# Patient Record
Sex: FEMALE | Employment: OTHER
[De-identification: names, ages, dates, MRNs, and addresses within clinical notes are randomized per-mention and may not be internally consistent; named-entity substitution may affect disease eponyms.]

---

## 2017-02-09 ENCOUNTER — OFFICE VISIT (OUTPATIENT)
Dept: OBGYN | Facility: CLINIC | Age: 62
End: 2017-02-09

## 2017-02-09 ENCOUNTER — HOSPITAL ENCOUNTER (OUTPATIENT)
Age: 62
Setting detail: SPECIMEN
Discharge: HOME | End: 2017-02-09
Payer: COMMERCIAL

## 2017-02-09 VITALS
HEIGHT: 63 IN | SYSTOLIC BLOOD PRESSURE: 130 MMHG | WEIGHT: 170 LBS | DIASTOLIC BLOOD PRESSURE: 70 MMHG | BODY MASS INDEX: 30.12 KG/M2

## 2017-02-09 DIAGNOSIS — Z12.31 ENCOUNTER FOR SCREENING MAMMOGRAM FOR MALIGNANT NEOPLASM OF BREAST: ICD-10-CM

## 2017-02-09 DIAGNOSIS — Z01.419 WOMEN'S ANNUAL ROUTINE GYNECOLOGICAL EXAMINATION: Primary | ICD-10-CM

## 2017-02-09 PROCEDURE — 99396 PREV VISIT EST AGE 40-64: CPT | Performed by: OBSTETRICS & GYNECOLOGY

## 2017-02-09 ASSESSMENT — ENCOUNTER SYMPTOMS
COUGH: 0
PHOTOPHOBIA: 0
NAUSEA: 0
BLOOD IN STOOL: 0
CHEST TIGHTNESS: 0
ABDOMINAL PAIN: 0
SHORTNESS OF BREATH: 0
DIARRHEA: 0
BACK PAIN: 0
SINUS PRESSURE: 1
RECTAL PAIN: 0
CONSTIPATION: 0
ANAL BLEEDING: 0
APNEA: 0
WHEEZING: 0
ABDOMINAL DISTENTION: 0
TROUBLE SWALLOWING: 0
SORE THROAT: 0

## 2017-02-16 LAB — CYTOLOGY REPORT: NORMAL

## 2017-02-27 ENCOUNTER — HOSPITAL ENCOUNTER (OUTPATIENT)
Dept: MAMMOGRAPHY | Age: 62
Discharge: HOME | End: 2017-02-27
Payer: COMMERCIAL

## 2017-02-27 DIAGNOSIS — Z12.31 ENCOUNTER FOR SCREENING MAMMOGRAM FOR MALIGNANT NEOPLASM OF BREAST: ICD-10-CM

## 2017-02-27 PROCEDURE — G0202 SCR MAMMO BI INCL CAD: HCPCS

## 2017-02-27 PROCEDURE — 77067 SCR MAMMO BI INCL CAD: CPT | Performed by: RADIOLOGY

## 2018-03-21 ENCOUNTER — HOSPITAL ENCOUNTER (OUTPATIENT)
Age: 63
Setting detail: SPECIMEN
Discharge: HOME | End: 2018-03-21
Payer: COMMERCIAL

## 2018-03-21 ENCOUNTER — OFFICE VISIT (OUTPATIENT)
Dept: OBGYN CLINIC | Age: 63
End: 2018-03-21
Payer: COMMERCIAL

## 2018-03-21 VITALS — HEIGHT: 63 IN | WEIGHT: 182 LBS | BODY MASS INDEX: 32.25 KG/M2

## 2018-03-21 DIAGNOSIS — Z01.419 WOMEN'S ANNUAL ROUTINE GYNECOLOGICAL EXAMINATION: Primary | ICD-10-CM

## 2018-03-21 DIAGNOSIS — Z12.31 ENCOUNTER FOR SCREENING MAMMOGRAM FOR MALIGNANT NEOPLASM OF BREAST: ICD-10-CM

## 2018-03-21 PROCEDURE — 99396 PREV VISIT EST AGE 40-64: CPT | Performed by: OBSTETRICS & GYNECOLOGY

## 2018-03-21 RX ORDER — VALSARTAN 80 MG/1
1 TABLET ORAL DAILY
COMMUNITY
Start: 2018-03-08

## 2018-03-21 RX ORDER — HYDROCORTISONE 5 MG/1
11.5 TABLET ORAL DAILY
COMMUNITY
Start: 2018-02-23

## 2018-03-21 RX ORDER — PENICILLIN V POTASSIUM 500 MG/1
500 TABLET ORAL 4 TIMES DAILY
COMMUNITY
End: 2018-03-21 | Stop reason: SDUPTHER

## 2018-03-21 RX ORDER — MINOCYCLINE HYDROCHLORIDE 50 MG/1
1 CAPSULE ORAL 2 TIMES DAILY
COMMUNITY
Start: 2018-02-05

## 2018-03-21 RX ORDER — VITAMIN B COMPLEX
1 CAPSULE ORAL DAILY
COMMUNITY

## 2018-03-21 RX ORDER — MINOCYCLINE HYDROCHLORIDE 50 MG/1
50 TABLET ORAL 2 TIMES DAILY
COMMUNITY
End: 2018-03-21 | Stop reason: SDUPTHER

## 2018-03-21 ASSESSMENT — ENCOUNTER SYMPTOMS
CHEST TIGHTNESS: 0
TROUBLE SWALLOWING: 0
BLOOD IN STOOL: 0
ANAL BLEEDING: 0
SHORTNESS OF BREATH: 0
ABDOMINAL PAIN: 0
DIARRHEA: 0
NAUSEA: 0
ABDOMINAL DISTENTION: 0
SORE THROAT: 0
BACK PAIN: 0
CONSTIPATION: 0
VOMITING: 0
WHEEZING: 0
COUGH: 0
APNEA: 0
RECTAL PAIN: 0

## 2018-03-21 NOTE — PROGRESS NOTES
immunocompromised state. Neurological: Negative for dizziness, seizures, syncope, speech difficulty, weakness, light-headedness, numbness and headaches. Hematological: Negative for adenopathy. Does not bruise/bleed easily. Psychiatric/Behavioral: Negative for agitation, decreased concentration, dysphoric mood, sleep disturbance and suicidal ideas. The patient is not nervous/anxious. ______________________________________________________________________  Physical Exam   Constitutional: She is oriented to person, place, and time. She appears well-developed and well-nourished. No distress. HENT:   Head: Normocephalic and atraumatic. Mouth/Throat: Oropharynx is clear and moist.   Eyes: Pupils are equal, round, and reactive to light. Neck: Normal range of motion. Neck supple. No thyromegaly present. Cardiovascular: Normal rate, regular rhythm and normal heart sounds. No murmur heard. Pulmonary/Chest: Effort normal and breath sounds normal. She has no wheezes. She has no rales. She exhibits no tenderness. Abdominal: Soft. Bowel sounds are normal. She exhibits no distension and no mass. There is no tenderness. There is no rebound and no guarding. Genitourinary: Vagina normal and uterus normal. Rectal exam shows no external hemorrhoid and anal tone normal. No breast swelling, tenderness, discharge or bleeding. There is no lesion on the right labia. There is no lesion on the left labia. Uterus is not deviated, not enlarged, not fixed and not tender. Cervix exhibits no motion tenderness, no discharge and no friability. Right adnexum displays no mass, no tenderness and no fullness. Left adnexum displays no mass, no tenderness and no fullness. No erythema in the vagina. No vaginal discharge found. Musculoskeletal: Normal range of motion. She exhibits no edema or tenderness. Lymphadenopathy:     She has no cervical adenopathy. Right: No inguinal adenopathy present.         Left: No inguinal

## 2018-03-21 NOTE — LETTER
[address] [place] [address] [address]  [facility] [place], [facility]  Phone: [phone]  Fax: [phone]    Dear Magnus Mccoy,    The results of the following test(s) you had done  are as follows and requires follow up as indicated.         Within Normal Limits   Further Action     Annual Follow Up      As Directed         Pap Smear:     [x]         []        Mammogram:             []          []   Endometrial Biopsy:               []                                            []   Cervical Biopsy:                     []                                             []   Dexascan:                                []                                            []                    Other:            Instructions for further action:         Even if findings are within normal limits now, it is important to continue annual visits with your doctor for regular screenings and exam.    Thank You,     Dr. Zuri Nash

## 2018-03-23 LAB
HPV SAMPLE: NORMAL
HPV SOURCE: NORMAL
HPV, GENOTYPE 16: NOT DETECTED
HPV, GENOTYPE 18: NOT DETECTED
HPV, HIGH RISK OTHER: NOT DETECTED
HPV, INTERPRETATION: NORMAL

## 2018-04-05 ENCOUNTER — HOSPITAL ENCOUNTER (OUTPATIENT)
Dept: MAMMOGRAPHY | Age: 63
Discharge: HOME | End: 2018-04-07
Payer: COMMERCIAL

## 2018-04-05 DIAGNOSIS — Z12.31 ENCOUNTER FOR SCREENING MAMMOGRAM FOR MALIGNANT NEOPLASM OF BREAST: ICD-10-CM

## 2018-04-05 LAB — CYTOLOGY REPORT: NORMAL

## 2018-04-05 PROCEDURE — 77067 SCR MAMMO BI INCL CAD: CPT

## 2019-03-27 ENCOUNTER — OFFICE VISIT (OUTPATIENT)
Dept: OBGYN CLINIC | Age: 64
End: 2019-03-27
Payer: MEDICARE

## 2019-03-27 ENCOUNTER — HOSPITAL ENCOUNTER (OUTPATIENT)
Age: 64
Setting detail: SPECIMEN
Discharge: HOME | End: 2019-03-27
Payer: MEDICARE

## 2019-03-27 VITALS
SYSTOLIC BLOOD PRESSURE: 138 MMHG | HEIGHT: 63 IN | BODY MASS INDEX: 32.71 KG/M2 | DIASTOLIC BLOOD PRESSURE: 70 MMHG | WEIGHT: 184.6 LBS

## 2019-03-27 DIAGNOSIS — Z12.31 ENCOUNTER FOR SCREENING MAMMOGRAM FOR MALIGNANT NEOPLASM OF BREAST: ICD-10-CM

## 2019-03-27 DIAGNOSIS — Z01.419 WELL FEMALE EXAM WITH ROUTINE GYNECOLOGICAL EXAM: Primary | ICD-10-CM

## 2019-03-27 PROCEDURE — G8484 FLU IMMUNIZE NO ADMIN: HCPCS | Performed by: OBSTETRICS & GYNECOLOGY

## 2019-03-27 PROCEDURE — G0101 CA SCREEN;PELVIC/BREAST EXAM: HCPCS | Performed by: OBSTETRICS & GYNECOLOGY

## 2019-03-27 RX ORDER — GLUCOSAMINE HCL 500 MG
1000 TABLET ORAL DAILY
COMMUNITY

## 2019-03-27 RX ORDER — CLONIDINE HYDROCHLORIDE 0.2 MG/1
0.2 TABLET ORAL
COMMUNITY

## 2019-03-27 RX ORDER — PENICILLIN V POTASSIUM 500 MG/1
500 TABLET ORAL 4 TIMES DAILY
COMMUNITY

## 2019-03-27 ASSESSMENT — ENCOUNTER SYMPTOMS
ABDOMINAL PAIN: 0
DIARRHEA: 0
NAUSEA: 0
WHEEZING: 0
COUGH: 0
BACK PAIN: 0
BLOOD IN STOOL: 0
APNEA: 0
CONSTIPATION: 0
SORE THROAT: 0
CHEST TIGHTNESS: 0
ANAL BLEEDING: 0
ABDOMINAL DISTENTION: 0
SHORTNESS OF BREATH: 0
TROUBLE SWALLOWING: 0
RECTAL PAIN: 0
PHOTOPHOBIA: 0

## 2019-04-09 LAB — CYTOLOGY REPORT: NORMAL

## 2019-04-22 ENCOUNTER — HOSPITAL ENCOUNTER (OUTPATIENT)
Dept: MAMMOGRAPHY | Age: 64
Discharge: HOME | End: 2019-04-24
Payer: MEDICARE

## 2019-04-22 DIAGNOSIS — Z12.31 ENCOUNTER FOR SCREENING MAMMOGRAM FOR MALIGNANT NEOPLASM OF BREAST: ICD-10-CM

## 2019-04-22 PROCEDURE — 77063 BREAST TOMOSYNTHESIS BI: CPT

## 2020-06-08 ENCOUNTER — OFFICE VISIT (OUTPATIENT)
Dept: OBGYN CLINIC | Age: 65
End: 2020-06-08
Payer: MEDICARE

## 2020-06-08 ENCOUNTER — HOSPITAL ENCOUNTER (OUTPATIENT)
Age: 65
Setting detail: SPECIMEN
Discharge: HOME | End: 2020-06-08
Payer: MEDICARE

## 2020-06-08 VITALS
BODY MASS INDEX: 30.12 KG/M2 | DIASTOLIC BLOOD PRESSURE: 74 MMHG | HEIGHT: 63 IN | SYSTOLIC BLOOD PRESSURE: 134 MMHG | WEIGHT: 170 LBS

## 2020-06-08 PROCEDURE — G0101 CA SCREEN;PELVIC/BREAST EXAM: HCPCS | Performed by: OBSTETRICS & GYNECOLOGY

## 2020-06-08 RX ORDER — CLINDAMYCIN HYDROCHLORIDE 300 MG/1
300 CAPSULE ORAL 2 TIMES DAILY
COMMUNITY

## 2020-06-08 RX ORDER — CHLORZOXAZONE 750 MG/1
750 TABLET ORAL 3 TIMES DAILY PRN
COMMUNITY

## 2020-06-08 RX ORDER — GUANFACINE 1 MG/1
1 TABLET ORAL 3 TIMES DAILY
COMMUNITY

## 2020-06-08 RX ORDER — TORSEMIDE 10 MG/1
10 TABLET ORAL 2 TIMES DAILY
COMMUNITY

## 2020-06-08 ASSESSMENT — ENCOUNTER SYMPTOMS
VOMITING: 0
CONSTIPATION: 0
WHEEZING: 0
DIARRHEA: 0
ABDOMINAL PAIN: 0
NAUSEA: 0
COUGH: 0

## 2020-06-08 NOTE — PROGRESS NOTES
[facility]  [facility]  [address] [facility]  [address] [facility]  [address] [id number]  Dept: [phone]  OF VISIT:  20        History and Physical    Samanta Rodríguez    :  1955  CHIEF COMPLAINT:    Chief Complaint   Patient presents with    Annual Exam     Prev Pap: 3/27/19 WNL; Prev Sondra: 19 WNL (WANTS TO SELF PAY FOR PAP SMEAR)                    HPI :   Samanta Rodríguez is a 75 y.o. female    The patient was seen and examined. Per the patient bowels are regular. She has novoiding complaints.   She denies any bloating as well as vaginal discharge.  _____________________________________________________________________  Past Medical History:   Diagnosis Date    Abnormal Pap smear 2004,     d/t ASCUS    Atrial fibrillation (HCC)     due to lyme disease    Chronic periodontal disease     Diverticulosis     Factor 5 Leiden mutation, heterozygous ([address]     On baby asa daily    Fibrocystic breast 04    Fibroid     USN    Foot fracture left    hairline    Genuine stress incontinence, female 03    HIGH CHOLESTEROL     History of cervical dysplasia 07    LEEP's x2    HPV (human papilloma virus) anogenital infection     Hypertension     Lyme disease     chronic    Osteopenia 06    Osteoporosis     of jaw    Posterior vitreous detachment of left eye     Scoliosis                                                                    Past Surgical History:   Procedure Laterality Date    BARTHOLIN GLAND CYST EXCISION      COLONOSCOPY   and     1st was a screening and 2nd was a diagnostic=diverticulitis    COLPOSCOPY  05    DILATION AND CURETTAGE  [id number]    due to miscarriage    LAPAROSCOPY  1993    pelvic congestion    LEEP  , 2006   [address]    x's 2---Jaw     Family History   Problem Relation Age of Onset    Other Father         Epilepsy, Pulomnary fibrosis    Other Paternal Grandfather minocycline 50 mg BID      aspirin 81 MG tablet Take 81 mg by mouth daily.  polyethyl glycol-propyl glycol 0.4-0.3 % (SYSTANE) 0.4-0.3 % ophthalmic solution 1 drop by Other route 4 times daily       Multiple Vitamin (MULTIVITAMIN PO) Take 1 tablet by mouth daily.  FLAXSEED Take 2 packets by mouth daily. 2 tablespoons       cloNIDine (CATAPRES) 0.2 MG tablet Take 0.2 mg by mouth Once weekly      Cholecalciferol (VITAMIN D3) 3000 units TABS Take 1,000 Units by mouth daily      valsartan (DIOVAN) 80 MG tablet Take 1 tablet by mouth daily      Propylene Glycol 0.6 % SOLN Instill to eye.  b complex vitamins capsule Take 1 capsule by mouth daily      NONFORMULARY Indications: Ampho - B tobra saline irrigation 1 x daily for sinus      doxycycline (ORACEA) 40 MG capsule Take 40 mg by mouth 2 times daily. No current facility-administered medications for this visit. Allergies:  Sulfa antibiotics    Gynecologic History:  No LMP recorded. Patient is postmenopausal.  Sexually Active: No  STD History: Yes HPV      OB History    Para Term  AB Living   7 5 0 0 2 0   SAB TAB Ectopic Molar Multiple Live Births   2 0 0 0 0 0     _____________________________________    Review of Systems   Constitutional: Negative for chills and fever. HENT: Negative for hearing loss. Respiratory: Negative for cough and wheezing. Cardiovascular: Negative for chest pain and palpitations. Gastrointestinal: Negative for abdominal pain, constipation, diarrhea, nausea and vomiting. Genitourinary: Negative for dysuria, frequency and urgency. Musculoskeletal: Negative for myalgias. Skin: Negative for rash. Neurological: Negative for dizziness, weakness and headaches. Hematological: Does not bruise/bleed easily. Psychiatric/Behavioral: Negative for suicidal ideas.        /74 (Position: Sitting, Cuff Size: Medium Adult)   Ht 5' 3.25\" (1.607 m)   Wt 170 lb (77.1 kg)   BMI 29.88

## 2020-06-11 LAB
CYTOLOGY REPORT: NORMAL
HPV SAMPLE: NORMAL
HPV, GENOTYPE 16: NOT DETECTED
HPV, GENOTYPE 18: NOT DETECTED
HPV, HIGH RISK OTHER: NOT DETECTED
HPV, INTERPRETATION: NORMAL
SPECIMEN DESCRIPTION: NORMAL

## 2020-06-17 ENCOUNTER — HOSPITAL ENCOUNTER (OUTPATIENT)
Dept: MAMMOGRAPHY | Age: 65
Discharge: HOME | End: 2020-06-19
Payer: MEDICARE

## 2020-06-17 PROCEDURE — 77063 BREAST TOMOSYNTHESIS BI: CPT

## 2021-03-09 ENCOUNTER — TELEPHONE (OUTPATIENT)
Dept: OBGYN CLINIC | Age: 66
End: 2021-03-09

## 2021-03-09 NOTE — TELEPHONE ENCOUNTER
Pt states PCP retired who prescribed her progesterone and was wondering if we would prescribe it for her now.

## 2021-03-09 NOTE — TELEPHONE ENCOUNTER
We can probably take over prescribing the progesterone, but there's no record of the dose or name of the progesterone (there's several kinds), or why she's taking it. Can we just get this information from her? All scripts need to be sent electronically so we'll need the specific information. Thanks!

## 2021-03-09 NOTE — TELEPHONE ENCOUNTER
Pt called back, she gets her Rx filled at [facility]. Her PCP was filling Rx but has retired and she has 5 or 6 refills left.

## 2021-03-11 ENCOUNTER — HOSPITAL ENCOUNTER (OUTPATIENT)
Age: 66
Setting detail: SPECIMEN
Discharge: HOME | End: 2021-03-11
Payer: MEDICARE

## 2021-03-11 ENCOUNTER — TELEPHONE (OUTPATIENT)
Dept: OBGYN CLINIC | Age: 66
End: 2021-03-11

## 2021-03-11 LAB
T3 FREE: 3.42 PG/ML (ref 2.02–4.43)
THYROXINE, FREE: 1.26 NG/DL (ref 0.93–1.7)
TSH SERPL DL<=0.05 MIU/L-ACNC: 1.28 MIU/L (ref 0.3–5)

## 2021-04-12 ENCOUNTER — HOSPITAL ENCOUNTER (OUTPATIENT)
Age: 66
Setting detail: SPECIMEN
Discharge: HOME | End: 2021-04-12
Payer: MEDICARE

## 2021-04-12 LAB
ABSOLUTE EOS #: 0.03 K/UL (ref 0–0.44)
ABSOLUTE IMMATURE GRANULOCYTE: <0.03 K/UL (ref 0–0.3)
ABSOLUTE LYMPH #: 1.15 K/UL (ref 1.1–3.7)
ABSOLUTE MONO #: 0.52 K/UL (ref 0.1–1.2)
ALBUMIN SERPL-MCNC: 4.3 G/DL (ref 3.5–5.2)
ALBUMIN/GLOBULIN RATIO: 1.1 (ref 1–2.5)
ALP BLD-CCNC: 81 U/L (ref 35–104)
ALT SERPL-CCNC: 19 U/L (ref 5–33)
ANION GAP SERPL CALCULATED.3IONS-SCNC: 14 MMOL/L (ref 9–17)
AST SERPL-CCNC: 18 U/L
BASOPHILS # BLD: 0 % (ref 0–2)
BASOPHILS ABSOLUTE: <0.03 K/UL (ref 0–0.2)
BILIRUB SERPL-MCNC: 0.19 MG/DL (ref 0.3–1.2)
BILIRUBIN URINE: NEGATIVE
BUN BLDV-MCNC: 18 MG/DL (ref 8–23)
BUN/CREAT BLD: ABNORMAL (ref 9–20)
C-REACTIVE PROTEIN: 3.8 MG/L (ref 0–5)
CALCIUM SERPL-MCNC: 9.4 MG/DL (ref 8.6–10.4)
CHLORIDE BLD-SCNC: 102 MMOL/L (ref 98–107)
CHOLESTEROL/HDL RATIO: 4.1
CHOLESTEROL: 222 MG/DL
CO2: 23 MMOL/L (ref 20–31)
COLOR: YELLOW
COMMENT UA: NORMAL
CREAT SERPL-MCNC: 0.58 MG/DL (ref 0.5–0.9)
DIFFERENTIAL TYPE: ABNORMAL
EOSINOPHILS RELATIVE PERCENT: 1 % (ref 1–4)
ESTIMATED AVERAGE GLUCOSE: 123 MG/DL
ESTRADIOL LEVEL: 16 PG/ML (ref 5–50)
GFR AFRICAN AMERICAN: >60 ML/MIN
GFR NON-AFRICAN AMERICAN: >60 ML/MIN
GFR SERPL CREATININE-BSD FRML MDRD: ABNORMAL ML/MIN/{1.73_M2}
GFR SERPL CREATININE-BSD FRML MDRD: ABNORMAL ML/MIN/{1.73_M2}
GLUCOSE BLD-MCNC: 90 MG/DL (ref 70–99)
GLUCOSE URINE: NEGATIVE
HBA1C MFR BLD: 5.9 % (ref 4–6)
HCT VFR BLD CALC: 42.4 % (ref 36.3–47.1)
HDLC SERPL-MCNC: 54 MG/DL
HEMOGLOBIN: 13.4 G/DL (ref 11.9–15.1)
IMMATURE GRANULOCYTES: 0 %
KETONES, URINE: NEGATIVE
LDL CHOLESTEROL: 146 MG/DL (ref 0–130)
LEUKOCYTE ESTERASE, URINE: NEGATIVE
LH: 38.3 U/L (ref 7.7–58.5)
LYMPHOCYTES # BLD: 21 % (ref 24–43)
MAGNESIUM: 2.2 MG/DL (ref 1.6–2.6)
MCH RBC QN AUTO: 29.2 PG (ref 25.2–33.5)
MCHC RBC AUTO-ENTMCNC: 31.6 G/DL (ref 28.4–34.8)
MCV RBC AUTO: 92.4 FL (ref 82.6–102.9)
MONOCYTES # BLD: 9 % (ref 3–12)
NITRITE, URINE: NEGATIVE
NRBC AUTOMATED: 0 PER 100 WBC
PDW BLD-RTO: 12.4 % (ref 11.8–14.4)
PH UA: 5 (ref 5–8)
PLATELET # BLD: 213 K/UL (ref 138–453)
PLATELET ESTIMATE: ABNORMAL
PMV BLD AUTO: 10.6 FL (ref 8.1–13.5)
POTASSIUM SERPL-SCNC: 4.5 MMOL/L (ref 3.7–5.3)
PROGESTERONE LEVEL: 3.85 NG/ML
PROTEIN UA: NEGATIVE
RBC # BLD: 4.59 M/UL (ref 3.95–5.11)
RBC # BLD: ABNORMAL 10*6/UL
RHEUMATOID FACTOR: <10 IU/ML
SEDIMENTATION RATE, ERYTHROCYTE: 27 MM (ref 0–30)
SEG NEUTROPHILS: 69 % (ref 36–65)
SEGMENTED NEUTROPHILS ABSOLUTE COUNT: 3.89 K/UL (ref 1.5–8.1)
SODIUM BLD-SCNC: 139 MMOL/L (ref 135–144)
SPECIFIC GRAVITY UA: 1.01 (ref 1–1.03)
TESTOSTERONE TOTAL: 19 NG/DL (ref 20–70)
TOTAL PROTEIN: 8.1 G/DL (ref 6.4–8.3)
TRIGL SERPL-MCNC: 108 MG/DL
TSH SERPL DL<=0.05 MIU/L-ACNC: 1.75 MIU/L (ref 0.3–5)
TURBIDITY: CLEAR
URIC ACID: 5.4 MG/DL (ref 2.4–5.7)
URINE HGB: NEGATIVE
UROBILINOGEN, URINE: NORMAL
VITAMIN D 25-HYDROXY: 92.4 NG/ML (ref 30–100)
VLDLC SERPL CALC-MCNC: ABNORMAL MG/DL (ref 1–30)
WBC # BLD: 5.6 K/UL (ref 3.5–11.3)
WBC # BLD: ABNORMAL 10*3/UL

## 2021-04-13 LAB — ANTI-NUCLEAR ANTIBODY (ANA): NEGATIVE

## 2021-04-14 LAB — ADRENOCORTICOTROPIC HORMONE: <5 PG/ML (ref 6–58)

## 2021-04-15 LAB
ALDOSTERONE COMMENT: NORMAL
ALDOSTERONE: 8 NG/DL

## 2021-04-16 ENCOUNTER — HOSPITAL ENCOUNTER (OUTPATIENT)
Age: 66
Setting detail: SPECIMEN
Discharge: HOME | End: 2021-04-16
Payer: MEDICARE

## 2021-04-16 LAB — PREGNENOLONE: 37 NG/DL (ref 15–132)

## 2021-04-21 LAB
17-KETOSTEROIDS 24 HOUR URINE: 10.7 MG/D (ref 3.2–10.6)
17-KETOSTEROIDS URINE: 5.8 MG/L
CREATININE URINE /24 HR: 1035 MG/D (ref 500–1400)
CREATININE URINE /VOLUME: 56 MG/DL
HOURS COLLECTED: 24
URINE VOLUME: 1848

## 2021-06-14 ENCOUNTER — OFFICE VISIT (OUTPATIENT)
Dept: OBGYN CLINIC | Age: 66
End: 2021-06-14
Payer: MEDICARE

## 2021-06-14 ENCOUNTER — HOSPITAL ENCOUNTER (OUTPATIENT)
Age: 66
Setting detail: SPECIMEN
Discharge: HOME | End: 2021-06-14
Payer: MEDICARE

## 2021-06-14 VITALS
HEIGHT: 63 IN | WEIGHT: 174 LBS | SYSTOLIC BLOOD PRESSURE: 132 MMHG | BODY MASS INDEX: 30.83 KG/M2 | DIASTOLIC BLOOD PRESSURE: 74 MMHG

## 2021-06-14 DIAGNOSIS — Z78.0 POSTMENOPAUSAL: ICD-10-CM

## 2021-06-14 DIAGNOSIS — Z12.31 ENCOUNTER FOR SCREENING MAMMOGRAM FOR BREAST CANCER: ICD-10-CM

## 2021-06-14 DIAGNOSIS — Z01.419 ENCOUNTER FOR GYNECOLOGICAL EXAMINATION: Primary | ICD-10-CM

## 2021-06-14 DIAGNOSIS — N95.0 POSTMENOPAUSAL BLEEDING: ICD-10-CM

## 2021-06-14 PROCEDURE — G0101 CA SCREEN;PELVIC/BREAST EXAM: HCPCS | Performed by: OBSTETRICS & GYNECOLOGY

## 2021-06-14 ASSESSMENT — ENCOUNTER SYMPTOMS
DIARRHEA: 0
COUGH: 0
ABDOMINAL PAIN: 0
CONSTIPATION: 0
VOMITING: 0
NAUSEA: 0
WHEEZING: 0

## 2021-06-14 NOTE — PROGRESS NOTES
[facility]  [facility] [address] [address]  Dept: [phone]  OF VISIT:  21        History and Physical    Mela Zhu    :  1955  CHIEF COMPLAINT:    Chief Complaint   Patient presents with    Annual Exam     Prev Pap: 20 WNL/HPV Neg; Prev Sondra: 20 WNL; Prev Dexa:  WNL                    HPI :   Mela Zhu is a 77 y.o. female    The patient was seen and examined. Per the patient bowels are regular. She has novoiding complaints. She denies any bloating as well as vaginal discharge. No vaginal dryness or hot flushes. Did notice two drops of blood on her pad (she wears for urinary incontinence), a few days ago. Pt requests yearly pap smears due her history of HPV in  and h/o 2 LEEP procedures.    _____________________________________________________________________  Past Medical History:   Diagnosis Date    Abnormal Pap smear 2004,     d/t ASCUS    Atrial fibrillation (HCC)     due to lyme disease    Chronic periodontal disease     Diverticulosis     Factor 5 Leiden mutation, heterozygous ([address]     On baby asa daily    Fibrocystic breast 04    Fibroid     USN    Foot fracture left    hairline    Genuine stress incontinence, female 03    HIGH CHOLESTEROL     History of cervical dysplasia 07    LEEP's x2    HPV (human papilloma virus) anogenital infection     Hypertension     Lyme disease     chronic    Osteopenia 06    Osteoporosis     of jaw    Posterior vitreous detachment of left eye     Scoliosis                                                                    Past Surgical History:   Procedure Laterality Date    BARTHOLIN GLAND CYST EXCISION      COLONOSCOPY   and     1st was a screening and 2nd was a diagnostic=diverticulitis    COLPOSCOPY  05    DILATION AND CURETTAGE  [id number]    due to miscarriage   [address]    pelvic congestion    LEEP  1994, 01/16/2006   [address]    x's 2---Jaw     Family History   Problem Relation Age of Onset    Other Father         Epilepsy, Pulomnary fibrosis    Other Paternal Grandfather         Heart Attacks    Other Maternal Grandmother         HD, Lung disease    Other Mother         Arthritis, phlebitis    Other Brother         Pancreatitis    Cancer Daughter         #1 - Melanoma at age 12   [facility] Other Daughter         #2 - Jaret Pedro, #3 binge eating disorder    Other Son         blood clots in both lungs at age 32, Leiden Factor 11    Other Other         oldest daughter 2 miscarriage sin 5 months     Social History     Tobacco Use   Smoking Status Never Smoker   Smokeless Tobacco Never Used     Social History     Substance and Sexual Activity   Alcohol Use No     Current Outpatient Medications   Medication Sig Dispense Refill    clindamycin (CLEOCIN) 300 MG capsule Take 300 mg by mouth 2 times daily      torsemide (DEMADEX) 10 MG tablet Take 10 mg by mouth 2 times daily      guanFACINE (TENEX) 1 MG tablet Take 1 mg by mouth 3 times daily      POTASSIUM PO Take 20 mcg by mouth daily      Sodium Fluoride (PREVIDENT DT) Place onto teeth Tooth Paste      Coenzyme Q10 (CO Q 10 PO) Take 1 capsule by mouth daily      penicillin v potassium (VEETID) 500 MG tablet Take 500 mg by mouth 4 times daily      NONFORMULARY ACTH gel. 1 ml daily      NONFORMULARY Cortef trini 1 mg as needed      hydrocortisone (CORTEF) 5 MG tablet Take 1 tablet by mouth 2 times daily      Hydroxocobalamin 1000 MCG/ML SOLN e3very 10 days      Magnesium Citrate (CITROMA PO) Take 296 mLs by mouth daily      minocycline (MINOCIN;DYNACIN) 50 MG capsule Take 1 capsule by mouth 2 times daily      metoprolol (TOPROL-XL) 25 MG XL tablet Take 50 mg by mouth 2 times daily       NONFORMULARY Indications: triest/ prog one capsule bid      NONFORMULARY Indications: natto-serazime for lyme disease 2 cap daily Cocktail of 3 different antibiotics m_ w- f every other week 300 mg clindamycinBID , 3/4 of 250 mg tablet BID, minocycline 50 mg BID      aspirin 81 MG tablet Take 81 mg by mouth daily.  polyethyl glycol-propyl glycol 0.4-0.3 % (SYSTANE) 0.4-0.3 % ophthalmic solution 1 drop by Other route 4 times daily       FLAXSEED Take 2 packets by mouth daily. 2 tablespoons       chlorzoxazone (LORZONE) 750 MG TABS tablet Take 750 mg by mouth 3 times daily as needed for Muscle spasms      cloNIDine (CATAPRES) 0.2 MG tablet Take 0.2 mg by mouth Once weekly      Cholecalciferol (VITAMIN D3) 3000 units TABS Take 1,000 Units by mouth daily      valsartan (DIOVAN) 80 MG tablet Take 1 tablet by mouth daily      Propylene Glycol 0.6 % SOLN Instill to eye.  b complex vitamins capsule Take 1 capsule by mouth daily      NONFORMULARY Indications: Ampho - B tobra saline irrigation 1 x daily for sinus      doxycycline (ORACEA) 40 MG capsule Take 40 mg by mouth 2 times daily.  Multiple Vitamin (MULTIVITAMIN PO) Take 1 tablet by mouth daily. No current facility-administered medications for this visit. Allergies:  Sulfa antibiotics    Gynecologic History:  No LMP recorded. Patient is postmenopausal.  Sexually Active: Yes  STD History: Yes HPV  Pap smear history: remote h/o HPV and LEEP x 2      OB History    Para Term  AB Living   7 5 0 0 2 0   SAB TAB Ectopic Molar Multiple Live Births   2 0 0 0 0 0     ______________________________________________________________________  REVIEW OF SYSTEMS:  Review of Systems   Constitutional: Negative for chills and fever. HENT: Negative for hearing loss. Respiratory: Negative for cough and wheezing. Cardiovascular: Negative for chest pain and palpitations. Gastrointestinal: Negative for abdominal pain, constipation, diarrhea, nausea and vomiting. Genitourinary: Negative for dysuria, frequency and urgency. Musculoskeletal: Negative for myalgias. Skin: Negative for rash. Neurological: Negative for dizziness, weakness and headaches. Hematological: Does not bruise/bleed easily. Psychiatric/Behavioral: Negative for suicidal ideas. /74 (Position: Sitting, Cuff Size: Large Adult)   Ht 5' 3.25\" (1.607 m)   Wt 174 lb (78.9 kg)   BMI 30.58 kg/m²                    Physical Exam:     Physical Exam  Constitutional:       Appearance: She is well-developed. HENT:      Head: Normocephalic. Neck:      Thyroid: No thyromegaly. Cardiovascular:      Rate and Rhythm: Normal rate and regular rhythm. Pulmonary:      Effort: Pulmonary effort is normal. No respiratory distress. Abdominal:      General: There is no distension. Palpations: Abdomen is soft. Tenderness: There is no abdominal tenderness. Genitourinary:     Labia:         Right: No rash, tenderness, lesion or injury. Left: No rash, tenderness, lesion or injury. Vagina: No signs of injury and foreign body. No vaginal discharge, erythema or tenderness. Cervix: No cervical motion tenderness, discharge or friability. Uterus: Not deviated, not enlarged, not fixed and not tender. Adnexa:         Right: No mass, tenderness or fullness. Left: No mass, tenderness or fullness. Musculoskeletal:         General: Normal range of motion. Cervical back: Normal range of motion. Skin:     General: Skin is warm and dry. Neurological:      Mental Status: She is alert and oriented to person, place, and time. Psychiatric:         Behavior: Behavior normal.         Thought Content: Thought content normal.         Judgment: Judgment normal.             ASSESSMENT:        77 y.o. Female; Annual   Diagnosis Orders   1. Encounter for gynecological examination  PAP SMEAR   2. Encounter for screening mammogram for breast cancer  ABNER DIGITAL SCREEN W OR WO CAD BILATERAL   3. Postmenopausal     4.  Postmenopausal bleeding  US PELVIS COMPLETE NON-OB TRANSABDOMINAL AND TRANSVAGINAL     Return in about 1 year (around 6/14/2022) for annual exam.              Hereditary Breast, Ovarian,Colon and Uterine Cancer screening Done. Tobacco & Secondary smoke risks reviewed; instructed oncessation and avoidance    PLAN:  - Pap collected per pt request.   -US for postmenopausal bleeding.   - Screening mammogram discussed and advised yearly if normal starting at age 36.  - Routine health maintenance per patients PCP.     Electronically signed by Ruth Carreno MD on 6/14/2021at 2:59 PM  [facility]

## 2021-06-17 LAB — CYTOLOGY REPORT: NORMAL

## 2021-06-22 ENCOUNTER — HOSPITAL ENCOUNTER (OUTPATIENT)
Age: 66
Setting detail: SPECIMEN
Discharge: HOME | End: 2021-06-22
Payer: MEDICARE

## 2021-06-22 LAB
C-REACTIVE PROTEIN: 3.4 MG/L (ref 0–5)
CORTISOL COLLECTION INFO: ABNORMAL
CORTISOL COLLECTION INFO: NORMAL
CORTISOL: 22 UG/DL (ref 2.7–18.4)
CORTISOL: 7.2 UG/DL (ref 2.7–18.4)
FOLLICLE STIMULATING HORMONE: 77.8 U/L (ref 25.8–134.8)
LH: 41.4 U/L (ref 7.7–58.5)
SEDIMENTATION RATE, ERYTHROCYTE: 27 MM (ref 0–30)
T3 FREE: 2.98 PG/ML (ref 2.02–4.43)
THYROXINE, FREE: 1.15 NG/DL (ref 0.93–1.7)
TSH SERPL DL<=0.05 MIU/L-ACNC: 1.97 MIU/L (ref 0.3–5)
VITAMIN D 25-HYDROXY: 84.6 NG/ML (ref 30–100)

## 2021-06-24 ENCOUNTER — TELEPHONE (OUTPATIENT)
Dept: OBGYN CLINIC | Age: 66
End: 2021-06-24

## 2021-06-24 LAB — PROLACTIN: 6.14 UG/L (ref 4.79–23.3)

## 2021-06-24 NOTE — TELEPHONE ENCOUNTER
I left Milagros a message to call. She has an apt here Monday for US results. Due to thickened endometrial lining  would like to do an EMB. I want to give her a heads up and advise some Ibuprofen prior to apt if she would like.

## 2021-06-25 ENCOUNTER — HOSPITAL ENCOUNTER (OUTPATIENT)
Dept: MAMMOGRAPHY | Age: 66
Discharge: HOME | End: 2021-06-27
Payer: MEDICARE

## 2021-06-25 VITALS — HEIGHT: 63 IN | BODY MASS INDEX: 29.95 KG/M2 | WEIGHT: 169 LBS

## 2021-06-25 DIAGNOSIS — Z12.31 ENCOUNTER FOR SCREENING MAMMOGRAM FOR BREAST CANCER: ICD-10-CM

## 2021-06-25 LAB
ALDOSTERONE COMMENT: NORMAL
ALDOSTERONE: 9.4 NG/DL
DHEAS (DHEA SULFATE): 125 UG/DL (ref 13–130)

## 2021-06-25 PROCEDURE — 77063 BREAST TOMOSYNTHESIS BI: CPT

## 2021-06-28 ENCOUNTER — OFFICE VISIT (OUTPATIENT)
Dept: OBGYN CLINIC | Age: 66
End: 2021-06-28
Payer: MEDICARE

## 2021-06-28 ENCOUNTER — HOSPITAL ENCOUNTER (OUTPATIENT)
Age: 66
Setting detail: SPECIMEN
Discharge: HOME | End: 2021-06-28
Payer: MEDICARE

## 2021-06-28 VITALS
WEIGHT: 172 LBS | HEIGHT: 63 IN | BODY MASS INDEX: 30.48 KG/M2 | SYSTOLIC BLOOD PRESSURE: 134 MMHG | DIASTOLIC BLOOD PRESSURE: 84 MMHG

## 2021-06-28 DIAGNOSIS — N95.0 POSTMENOPAUSAL BLEEDING: Primary | ICD-10-CM

## 2021-06-28 PROCEDURE — 58100 BIOPSY OF UTERUS LINING: CPT | Performed by: OBSTETRICS & GYNECOLOGY

## 2021-06-28 NOTE — PROGRESS NOTES
HPI:  Rasheeda Lao is a 77 y.o. [id number] with an episode of post menopausal spotting earlier this month. US showed a 6.41 cm uterus and 0.47 cm endometrium. Discussed need for biopsy to rule out cancerous or pre-cancerous lesion. Endometrial Biopsy:  The patient was counseled on the procedure. Risks, benefits and alternatives were reviewed. The patient is aware that this is diagnostic and not curative and a second procedure may be needed. A consent was reviewed and obtained. The patient was positioned comfortably on the exam table. A sterile speculum was placed into the vagina and the cervix was identified. It was cleansed with betadine. A single tooth tenaculum was not needed to stabilize the cervix. The aspirator was then gently passed into the endometrial cavity. The uterus sounded to 6 cm. Two passes were made with return of small amount of tissue. Tissue was obtained and sent to pathology. The patient tolerated the procedure well. Post procedure restrictions were reviewed and given to the patient. .  All counts and instruments were correct at the end of the procedure.      Michell Webb MD  6/28/21  3:50 PM

## 2021-06-30 LAB — SURGICAL PATHOLOGY REPORT: NORMAL

## 2022-07-26 ENCOUNTER — HOSPITAL ENCOUNTER (OUTPATIENT)
Age: 67
Setting detail: SPECIMEN
Discharge: HOME | End: 2022-07-26

## 2022-07-26 LAB
ABSOLUTE EOS #: 0.07 K/UL (ref 0–0.44)
ABSOLUTE IMMATURE GRANULOCYTE: 0.03 K/UL (ref 0–0.3)
ABSOLUTE LYMPH #: 0.98 K/UL (ref 1.1–3.7)
ABSOLUTE MONO #: 0.54 K/UL (ref 0.1–1.2)
ALBUMIN SERPL-MCNC: 4.4 G/DL (ref 3.5–5.2)
ALBUMIN/GLOBULIN RATIO: 1.2 (ref 1–2.5)
ALP BLD-CCNC: 94 U/L (ref 35–104)
ALT SERPL-CCNC: 14 U/L (ref 5–33)
ANION GAP SERPL CALCULATED.3IONS-SCNC: 18 MMOL/L (ref 9–17)
AST SERPL-CCNC: 17 U/L
BASOPHILS # BLD: 0 % (ref 0–2)
BASOPHILS ABSOLUTE: <0.03 K/UL (ref 0–0.2)
BILIRUB SERPL-MCNC: 0.31 MG/DL (ref 0.3–1.2)
BILIRUBIN URINE: NEGATIVE
BUN BLDV-MCNC: 20 MG/DL (ref 8–23)
C-REACTIVE PROTEIN: 4 MG/L (ref 0–5)
CALCIUM SERPL-MCNC: 9.3 MG/DL (ref 8.6–10.4)
CHLORIDE BLD-SCNC: 105 MMOL/L (ref 98–107)
CHOLESTEROL/HDL RATIO: 4.2
CHOLESTEROL: 214 MG/DL
CO2: 21 MMOL/L (ref 20–31)
COLOR: YELLOW
COMMENT UA: NORMAL
CREAT SERPL-MCNC: 0.64 MG/DL (ref 0.5–0.9)
EOSINOPHILS RELATIVE PERCENT: 1 % (ref 1–4)
ESTRADIOL LEVEL: 13.7 PG/ML (ref 5–50)
FOLATE: >20 NG/ML
FOLLICLE STIMULATING HORMONE: 69 MIU/ML (ref 25.8–134.8)
GFR AFRICAN AMERICAN: >60 ML/MIN
GFR NON-AFRICAN AMERICAN: >60 ML/MIN
GFR SERPL CREATININE-BSD FRML MDRD: ABNORMAL ML/MIN/{1.73_M2}
GLUCOSE BLD-MCNC: 89 MG/DL (ref 70–99)
GLUCOSE URINE: NEGATIVE
HCT VFR BLD CALC: 42.5 % (ref 36.3–47.1)
HDLC SERPL-MCNC: 51 MG/DL
HEMOGLOBIN: 13.3 G/DL (ref 11.9–15.1)
IMMATURE GRANULOCYTES: 1 %
KETONES, URINE: NEGATIVE
LDL CHOLESTEROL: 139 MG/DL (ref 0–130)
LEUKOCYTE ESTERASE, URINE: NEGATIVE
LH: 37.2 MIU/ML (ref 7.7–58.5)
LYMPHOCYTES # BLD: 17 % (ref 24–43)
MCH RBC QN AUTO: 29.3 PG (ref 25.2–33.5)
MCHC RBC AUTO-ENTMCNC: 31.3 G/DL (ref 28.4–34.8)
MCV RBC AUTO: 93.6 FL (ref 82.6–102.9)
MONOCYTES # BLD: 9 % (ref 3–12)
NITRITE, URINE: NEGATIVE
NRBC AUTOMATED: 0 PER 100 WBC
PDW BLD-RTO: 13.1 % (ref 11.8–14.4)
PH UA: 5.5 (ref 5–8)
PLATELET # BLD: 216 K/UL (ref 138–453)
PMV BLD AUTO: 11.3 FL (ref 8.1–13.5)
POTASSIUM SERPL-SCNC: 4.2 MMOL/L (ref 3.7–5.3)
PROGESTERONE LEVEL: 1.85 NG/ML
PROTEIN UA: NEGATIVE
RBC # BLD: 4.54 M/UL (ref 3.95–5.11)
SEDIMENTATION RATE, ERYTHROCYTE: 55 MM/HR (ref 0–30)
SEG NEUTROPHILS: 72 % (ref 36–65)
SEGMENTED NEUTROPHILS ABSOLUTE COUNT: 4.3 K/UL (ref 1.5–8.1)
SODIUM BLD-SCNC: 144 MMOL/L (ref 135–144)
SPECIFIC GRAVITY UA: 1.02 (ref 1–1.03)
THYROXINE, FREE: 1.09 NG/DL (ref 0.93–1.7)
TOTAL PROTEIN: 8.1 G/DL (ref 6.4–8.3)
TRIGL SERPL-MCNC: 119 MG/DL
TSH SERPL DL<=0.05 MIU/L-ACNC: 1.69 UIU/ML (ref 0.3–5)
TURBIDITY: CLEAR
URIC ACID: 5.7 MG/DL (ref 2.4–5.7)
URINE HGB: NEGATIVE
UROBILINOGEN, URINE: NORMAL
VITAMIN B-12: >2000 PG/ML (ref 232–1245)
WBC # BLD: 5.9 K/UL (ref 3.5–11.3)

## 2022-07-27 LAB
ADRENOCORTICOTROPIC HORMONE: <5 PG/ML (ref 6–58)
DHEAS (DHEA SULFATE): 83.3 UG/DL (ref 13–130)
T3 FREE: 2.95 PG/ML (ref 2.02–4.43)
TESTOSTERONE TOTAL: 16 NG/DL (ref 20–70)

## 2022-07-28 ENCOUNTER — HOSPITAL ENCOUNTER (OUTPATIENT)
Age: 67
Setting detail: SPECIMEN
Discharge: HOME | End: 2022-07-28

## 2022-07-28 ENCOUNTER — OFFICE VISIT (OUTPATIENT)
Dept: OBGYN CLINIC | Age: 67
End: 2022-07-28
Payer: MEDICARE

## 2022-07-28 VITALS
WEIGHT: 170 LBS | DIASTOLIC BLOOD PRESSURE: 88 MMHG | HEIGHT: 61 IN | SYSTOLIC BLOOD PRESSURE: 160 MMHG | BODY MASS INDEX: 32.1 KG/M2

## 2022-07-28 DIAGNOSIS — Z01.419 ENCOUNTER FOR WELL WOMAN EXAM: Primary | ICD-10-CM

## 2022-07-28 DIAGNOSIS — Z78.0 POSTMENOPAUSAL: ICD-10-CM

## 2022-07-28 DIAGNOSIS — Z12.11 SPECIAL SCREENING FOR MALIGNANT NEOPLASM OF COLON: ICD-10-CM

## 2022-07-28 DIAGNOSIS — Z12.31 ENCOUNTER FOR SCREENING MAMMOGRAM FOR BREAST CANCER: ICD-10-CM

## 2022-07-28 PROCEDURE — G0101 CA SCREEN;PELVIC/BREAST EXAM: HCPCS | Performed by: STUDENT IN AN ORGANIZED HEALTH CARE EDUCATION/TRAINING PROGRAM

## 2022-07-28 NOTE — PROGRESS NOTES
[address] [facility]  [address] [facility], [facility]      Bhaskar De La Rosa  2024                       Primary Care Physician: Gautam Salinas MD    CC:   Chief Complaint   Patient presents with    Annual Exam     p 21 neg m 21 birads1  FHxBrCa n/a          HPI: Bhaskar De La Rosa is a 69 y.o. female  No LMP recorded. Patient is postmenopausal.    The patient was seen and examined. She is here for an annual visit. She is complaining of nothing. Patient is menopausal and denies any bleeding. Her bowel habits are regular. She denies any bloating. She denies dysuria. She denies urinary leaking. She denies vaginal discharge.       Depression Screen: Symptoms of decreased mood absent  Symptoms of anhedonia absent  **If either question is answered in a  positive fashion then complete the PHQ9 Scoring Evaluation and make the appropriate referral**    REVIEW OF SYSTEMS:   Constitutional: negative fever, negative chills  HEENT: negative visual disturbances, negative headaches  Respiratory: negative dyspnea, negative cough  Cardiovascular: negative chest pain,  negative palpitations  Gastrointestinal: negative abdominal pain, negative RUQ pain, negative N/V, negative diarrhea, negative constipation  Genitourinary: negative dysuria, negative vaginal discharge  Dermatological: negative rash  Hematologic: negative bruising  Immunologic/Lymphatic: negative recent illness, negative recent sick contact  Musculoskeletal: negative back pain, negative myalgias, negative arthralgias  Neurological:  negative dizziness, negative weakness  Behavior/Psych: negative depression, negative anxiety      GYNECOLOGICAL HISTORY:  Age of Menarche: 13  Age of Menopause: 44     STD History: no past history    Permanent Sterilization: no  Reversible Birth Control: no  Hormone Replacement Exposure: no    OBSTETRICAL HISTORY:  OB History    Para Term  AB Living   7 5 0 0 2 0   SAB IAB Ectopic Molar Multiple Live Births   2 0 0 0 0 0      # Outcome Date GA Lbr Gunnar/2nd Weight Sex Delivery Anes PTL Lv   7 Para 1995   6 lb 3 oz (2.807 kg) F Vag-Spont         Birth Comments: Down's   6 Para 1990   7 lb (3.175 kg) F Vag-Spont      5 Para 1988   7 lb 7 oz (3.374 kg) F Vag-Spont      4 Para 1986   7 lb 12 oz (3.515 kg) M Vag-Spont      3 Para 1984   5 lb 12 oz (2.608 kg) F Vag-Spont      2 SAB            1 SAB                PAST MEDICAL HISTORY:   has a past medical history of Abnormal Pap smear, Atrial fibrillation ([facility] [address] Chronic periodontal disease, Diverticulosis, Factor 5 Leiden mutation, heterozygous ([facility] [address] Fibrocystic breast, Fibroid, Foot fracture, Genuine stress incontinence, female, HIGH CHOLESTEROL, History of cervical dysplasia, HPV (human papilloma virus) anogenital infection, Hypertension, Lyme disease, Osteopenia, Osteoporosis, Posterior vitreous detachment of left eye, and Scoliosis. PAST SURGICAL HISTORY:   has a past surgical history that includes Colposcopy (11/21/05); LEEP (1994, 01/16/2006); Mouth surgery (1981); Bartholin gland cyst excision; Colonoscopy (2003 and 2008); laparoscopy (1993); and Dilation & curettage (7701&[id number]). ALLERGIES:  is allergic to sulfa antibiotics. MEDICATIONS:  Prior to Admission medications    Medication Sig Start Date End Date Taking? Authorizing Provider   MISC NATURAL PRODUCTS PO Take by mouth Olive leaf extract - antiviral   Taking other natural supplements to support immune system  [place] - vit C   Yes Historical Provider, MD   clindamycin (CLEOCIN) 300 MG capsule Take 300 mg by mouth 2 times daily   Yes Historical Provider, MD   torsemide (DEMADEX) 10 MG tablet Take 10 mg by mouth in the morning and 10 mg in the evening. 0.25 tablet BID.    Yes Historical Provider, MD   chlorzoxazone (PARAFON FORTE) 750 MG TABS tablet Take 750 mg by mouth 3 times daily as needed for Muscle spasms   Yes Historical Provider, MD   guanFACINE (TENEX) 1 MG tablet Take 1 mg by mouth 3 times daily   Yes Historical Provider, MD   POTASSIUM PO Take 20 mcg by mouth daily   Yes Historical Provider, MD   Coenzyme Q10 (CO Q 10 PO) Take 1 capsule by mouth daily Citrus Q10   Yes Historical Provider, MD   penicillin v potassium (VEETID) 500 MG tablet Take 500 mg by mouth 4 times daily   Yes Historical Provider, MD   NONFORMULARY ACTH gel. 1 ml daily   Yes Historical Provider, MD   NONFORMULARY Cortef trini 2 mg as needed   Yes Historical Provider, MD   Cholecalciferol (VITAMIN D3) 3000 units TABS Take 1,000 Units by mouth daily   Yes Historical Provider, MD   hydrocortisone (CORTEF) 5 MG tablet Take 11.5 tablets by mouth in the morning. 11.5mg in am - 1mg @ noon. 2/23/18  Yes Historical Provider, MD   Hydroxocobalamin 1000 MCG/ML SOLN e3very 10 days 2/21/18  Yes Historical Provider, MD   Magnesium Citrate (CITROMA PO) Take 296 mLs by mouth daily 2tsp daily (powdder) - just magnesium   Yes Historical Provider, MD   minocycline (MINOCIN;DYNACIN) 50 MG capsule Take 1 capsule by mouth 2 times daily 2/5/18  Yes Historical Provider, MD   metoprolol (TOPROL-XL) 25 MG XL tablet Take 50 mg by mouth 2 times daily    Yes Historical Provider, MD   NONFORMULARY Indications: triest/ prog one capsule bid   Yes Historical Provider, MD   NONFORMULARY Indications: natto-serazime for lyme disease 2 cap daily Cocktail of 3 different antibiotics m_ w- f every other week 300 mg clindamycinBID , 3/4 of 250 mg capsulet BID, minocycline 50 mg BID   Yes Historical Provider, MD   NONFORMULARY Indications: Ampho - B tobra saline irrigation 1 x daily for sinus   Yes Historical Provider, MD   aspirin 81 MG tablet Take 81 mg by mouth daily. Yes Historical Provider, MD   polyethyl glycol-propyl glycol 0.4-0.3 % (SYSTANE) 0.4-0.3 % ophthalmic solution 1 drop by Other route 4 times daily    Yes Historical Provider, MD   FLAXSEED Take 2 packets by mouth daily.  2 tablespoons    Yes Historical Provider, MD   Sodium Fluoride (PREVIDENT DT) Place onto teeth Tooth Paste  Patient not taking: Reported on 7/28/2022    Historical Provider, MD   cloNIDine (CATAPRES) 0.2 MG tablet Take 0.2 mg by mouth Once weekly  Patient not taking: Reported on 7/28/2022    Historical Provider, MD   valsartan (DIOVAN) 80 MG tablet Take 1 tablet by mouth daily  Patient not taking: Reported on 7/28/2022 3/8/18   Historical Provider, MD   Propylene Glycol 0.6 % SOLN Instill to eye. Patient not taking: Reported on 7/28/2022    Historical Provider, MD   b complex vitamins capsule Take 1 capsule by mouth daily  Patient not taking: Reported on 7/28/2022    Historical Provider, MD   doxycycline (ORACEA) 40 MG capsule Take 40 mg by mouth 2 times daily. Patient not taking: Reported on 7/28/2022    Historical Provider, MD   Multiple Vitamin (MULTIVITAMIN PO) Take 1 tablet by mouth daily. Patient not taking: Reported on 7/28/2022    Historical Provider, MD       FAMILY HISTORY:  Family History of Breast, Ovarian, Colon or Uterine Cancer: No   family history includes Cancer in her daughter; Heart Failure (age of onset: 80) in her mother; Other in her brother, daughter, father, maternal grandmother, mother, paternal grandfather, son, and another family member. SOCIAL HISTORY:   reports that she has never smoked. She has never used smokeless tobacco. She reports that she does not drink alcohol and does not use drugs. HEALTH MAINTENANCE:  Immunization status: stated as up to date, no records available    [address] [place] [address]: BIRADS 1 6/21. Next due now. Colonoscopy: Desires Cologuard  Pap Smears: Last 6/21 Neg. Desires yearly due to Hx LEEP x 2. Family Planning: Menopausal  DEXA: 2016 Neg. Due now.     VITALS:  Vitals:    07/28/22 1424 07/28/22 1513   BP: (!) 145/85 (!) 160/88   Site: Left Upper Arm Right Upper Arm   Position: Sitting Sitting   Cuff Size: Small Adult Large Adult   Weight: 170 lb (77.1 kg) Height: 5' 1.25\" (1.556 m)                                                                                                                                                                                                    PHYSICAL EXAM:   General Appearance: Appears healthy. Alert; in no acute distress. Pleasant. Skin: Skin color, texture, turgor normal. No rashes or lesions. HEENT: normocephalic and atraumatic, Thyroid normal to inspection and palpation  Respiratory: Normal expansion. Clear to auscultation. No rales, rhonchi, or wheezing. Cardiovascular: normal rate, normal S1 and S2, no gallops, intact distal pulses and no carotid bruits  Breast:  (Chest): normal appearance, no masses or tenderness  Abdomen: soft, non-tender, non-distended, no right upper quadrant tenderness and no CVA tenderness  Pelvic Exam:   External genitalia: General appearance; normal, Hair distribution; normal, Lesions absent  Urinary system: urethral meatus normal  Vaginal: normal mucosa, no discharge  Cervix: normal appearing cervix without discharge or lesions  Adnexa: non-palpable  Uterus: normal single, nontender  Rectal Exam: exam declined by patient  Musculoskeletal: no gross abnormalities  Extremities: non-tender BLE and non-edematous  Psych:  oriented to time, place and person     DATA:  No results found for this visit on 22. ASSESSMENT & PLAN:    Gary Orellana is a 69 y.o. female  No LMP recorded. Patient is postmenopausal.      Annual:  Mammogram: BIRADS 1 . Next due now. Colonoscopy: Desires Cologuard  Pap Smears: Last  Neg. Desires yearly due to Hx LEEP x 2. Family Planning: Menopausal  DEXA:  Neg. Due now.     Elevated BP   - Follow up with PCP      Patient Active Problem List    Diagnosis Date Noted    Osteopenia 10/01/2012    History of cervical dysplasia 10/01/2012    GSI (genuine stress incontinence), female 10/01/2012    Fibroid 10/01/2012       Return in about 1 year (around 7/28/2023) for Annual.  No Patient Care Coordination Note on file. Counseling Completed:    Discussed need for repeat pap as per American Society for Colposcopy and Cervical Pathology guidelines. Discussed need for mammograms every 1 year, If >44 yo and last mammogram was negative. Discussed Calcium and Vitamin D dosing. Discussed need for colonoscopy screening as well as onset for bone density testing. Discussed birth control and barrier recommendations. Discussed STD counseling and prevention. Discussed Gardisil counseling for all patients 10-35 yo. Hereditary Breast, Ovarian, Colon and Uterine Cancer screening discussed. Tobacco & Secondary smoke risks discussed; with recommendation for cessation and avoidance. Routine health maintenance per patients PCP discussed. Diagnosis Orders   1. Encounter for well woman exam  PAP Smear      2. Encounter for screening mammogram for breast cancer  ABNER DIGITAL SCREEN W OR WO CAD BILATERAL      3. Postmenopausal  DEXA BONE DENSITY 2 SITES      4.  Special screening for malignant neoplasm of colon  Fecal DNA Colorectal cancer screening (Cologuard)           Myles Frias DO  [address] [facility]  7/28/2022, 4:08 PM

## 2022-07-28 NOTE — PROGRESS NOTES
Chaperone for Intimate Exam  Chaperone was offered as part of the rooming process. Patient declined and agrees to continue with exam without a chaperone.   Chaperone: n/a

## 2022-07-30 LAB — PREGNENOLONE: 54 NG/DL (ref 15–132)

## 2022-07-31 LAB
HPV SAMPLE: NORMAL
HPV, GENOTYPE 16: NOT DETECTED
HPV, GENOTYPE 18: NOT DETECTED
HPV, HIGH RISK OTHER: NOT DETECTED
HPV, INTERPRETATION: NORMAL
SPECIMEN DESCRIPTION: NORMAL

## 2022-08-04 LAB — CYTOLOGY REPORT: NORMAL

## 2022-08-08 LAB — 18-HYDROXYCORTICOSTERONE: <5 NG/DL

## 2022-08-11 ENCOUNTER — HOSPITAL ENCOUNTER (OUTPATIENT)
Dept: MAMMOGRAPHY | Age: 67
Discharge: HOME | End: 2022-08-13
Payer: MEDICARE

## 2022-08-11 DIAGNOSIS — Z12.31 ENCOUNTER FOR SCREENING MAMMOGRAM FOR BREAST CANCER: ICD-10-CM

## 2022-08-11 PROCEDURE — 77063 BREAST TOMOSYNTHESIS BI: CPT

## 2023-05-15 ENCOUNTER — HOSPITAL ENCOUNTER (OUTPATIENT)
Age: 68
Setting detail: SPECIMEN
Discharge: HOME | End: 2023-05-15

## 2023-05-15 LAB
ALBUMIN SERPL-MCNC: 4.1 G/DL (ref 3.5–5.2)
ALBUMIN/GLOB SERPL: 1.1 {RATIO} (ref 1–2.5)
ALP SERPL-CCNC: 85 U/L (ref 35–104)
ALT SERPL-CCNC: 16 U/L (ref 5–33)
ANION GAP SERPL CALCULATED.3IONS-SCNC: 14 MMOL/L (ref 9–17)
AST SERPL-CCNC: 17 U/L
BASOPHILS # BLD: 0 % (ref 0–2)
BASOPHILS # BLD: <0.03 K/UL (ref 0–0.2)
BILIRUB SERPL-MCNC: 0.2 MG/DL (ref 0.3–1.2)
BILIRUB UR QL STRIP: NEGATIVE
BNP SERPL-MCNC: 57 PG/ML
BUN SERPL-MCNC: 16 MG/DL (ref 8–23)
CALCIUM SERPL-MCNC: 9.2 MG/DL (ref 8.6–10.4)
CHLORIDE SERPL-SCNC: 105 MMOL/L (ref 98–107)
CHOLEST SERPL-MCNC: 226 MG/DL
CHOLESTEROL/HDL RATIO: 4.3
CLARITY UR: CLEAR
CO2 SERPL-SCNC: 23 MMOL/L (ref 20–31)
COLOR UR: YELLOW
COMMENT UA: NORMAL
CREAT SERPL-MCNC: 0.68 MG/DL (ref 0.5–0.9)
CRP SERPL HS-MCNC: 3.8 MG/L (ref 0–5)
EOSINOPHIL # BLD: 0.1 K/UL (ref 0–0.44)
EOSINOPHILS RELATIVE PERCENT: 1 % (ref 1–4)
ERYTHROCYTE [DISTWIDTH] IN BLOOD BY AUTOMATED COUNT: 12.4 % (ref 11.8–14.4)
ERYTHROCYTE [SEDIMENTATION RATE] IN BLOOD BY WESTERGREN METHOD: 23 MM/HR (ref 0–30)
ESTRADIOL LEVEL: 15.1 PG/ML (ref 5–50)
FSH SERPL-ACNC: 73.8 MIU/ML (ref 25.8–134.8)
GFR SERPL CREATININE-BSD FRML MDRD: >60 ML/MIN/1.73M2
GLUCOSE SERPL-MCNC: 100 MG/DL (ref 70–99)
GLUCOSE UR STRIP.AUTO-MCNC: NEGATIVE MG/DL
HCT VFR BLD AUTO: 41.9 % (ref 36.3–47.1)
HDLC SERPL-MCNC: 53 MG/DL
HGB BLD-MCNC: 13.3 G/DL (ref 11.9–15.1)
HGB UR QL STRIP.AUTO: NEGATIVE
IMM GRANULOCYTES # BLD AUTO: 0.03 K/UL (ref 0–0.3)
IMM GRANULOCYTES NFR BLD: 0 %
KETONES UR STRIP.AUTO-MCNC: NEGATIVE MG/DL
LDLC SERPL CALC-MCNC: 151 MG/DL (ref 0–130)
LEUKOCYTE ESTERASE UR QL STRIP: NEGATIVE
LH SERPL-ACNC: 40.2 MIU/ML (ref 7.7–58.5)
LYMPHOCYTES # BLD: 18 % (ref 24–43)
LYMPHOCYTES NFR BLD: 1.25 K/UL (ref 1.1–3.7)
MCH RBC QN AUTO: 29.9 PG (ref 25.2–33.5)
MCHC RBC AUTO-ENTMCNC: 31.7 G/DL (ref 28.4–34.8)
MCV RBC AUTO: 94.2 FL (ref 82.6–102.9)
MONOCYTES NFR BLD: 0.5 K/UL (ref 0.1–1.2)
MONOCYTES NFR BLD: 7 % (ref 3–12)
NEUTROPHILS NFR BLD: 74 % (ref 36–65)
NEUTS SEG NFR BLD: 5.02 K/UL (ref 1.5–8.1)
NITRITE UR QL STRIP: NEGATIVE
NRBC AUTOMATED: 0 PER 100 WBC
PLATELET # BLD AUTO: 229 K/UL (ref 138–453)
PMV BLD AUTO: 10.8 FL (ref 8.1–13.5)
POTASSIUM SERPL-SCNC: 4.3 MMOL/L (ref 3.7–5.3)
PROT SERPL-MCNC: 8 G/DL (ref 6.4–8.3)
PROT UR STRIP-MCNC: NEGATIVE MG/DL
PROT UR STRIP.AUTO-MCNC: 6.5 MG/DL (ref 5–8)
RBC # BLD AUTO: 4.45 M/UL (ref 3.95–5.11)
SODIUM SERPL-SCNC: 142 MMOL/L (ref 135–144)
SP GR UR STRIP.AUTO: 1.01 (ref 1–1.03)
TRIGL SERPL-MCNC: 112 MG/DL
UROBILINOGEN UR STRIP-ACNC: NORMAL
WBC OTHER # BLD: 6.9 K/UL (ref 3.5–11.3)

## 2023-05-16 LAB
25(OH)D3 SERPL-MCNC: 94 NG/ML
ACTH PLAS-MCNC: <5 PG/ML (ref 6–58)
DHEA-S SERPL-MCNC: 92.3 UG/DL (ref 13–130)
PROGEST SERPL-MCNC: 2.9 NG/ML
T3FREE SERPL-MCNC: 2.76 PG/ML (ref 2.02–4.43)
T4 FREE SERPL-MCNC: 1.2 NG/DL (ref 0.9–1.7)
TESTOST SERPL-MCNC: 18 NG/DL (ref 20–70)
TSH SERPL-ACNC: 1.97 UIU/ML (ref 0.3–5)

## 2023-05-18 LAB — PREGNENOLONE: 39 NG/DL (ref 15–132)

## 2023-05-21 LAB
REASON FOR REJECTION: NORMAL
SPECIMEN SOURCE: NORMAL
ZZ NTE CLEAN UP: ORDERED TEST: NORMAL

## 2023-05-26 LAB — 18-HYDROXYCORTICOSTERONE: <5 NG/DL

## 2023-06-02 ENCOUNTER — HOSPITAL ENCOUNTER (OUTPATIENT)
Age: 68
Setting detail: SPECIMEN
Discharge: HOME | End: 2023-06-02

## 2023-06-02 LAB
FOLATE SERPL-MCNC: >20 NG/ML
VIT B12 SERPL-MCNC: >2000 PG/ML (ref 232–1245)

## 2023-08-03 ENCOUNTER — OFFICE VISIT (OUTPATIENT)
Dept: OBGYN CLINIC | Age: 68
End: 2023-08-03

## 2023-08-03 ENCOUNTER — HOSPITAL ENCOUNTER (OUTPATIENT)
Age: 68
Setting detail: SPECIMEN
Discharge: HOME | End: 2023-08-03

## 2023-08-03 VITALS
DIASTOLIC BLOOD PRESSURE: 85 MMHG | HEART RATE: 87 BPM | BODY MASS INDEX: 31.54 KG/M2 | SYSTOLIC BLOOD PRESSURE: 181 MMHG | HEIGHT: 63 IN | WEIGHT: 178 LBS

## 2023-08-03 DIAGNOSIS — Z12.31 ENCOUNTER FOR SCREENING MAMMOGRAM FOR MALIGNANT NEOPLASM OF BREAST: ICD-10-CM

## 2023-08-03 DIAGNOSIS — Z78.0 POSTMENOPAUSAL: ICD-10-CM

## 2023-08-03 DIAGNOSIS — Z01.419 WOMEN'S ANNUAL ROUTINE GYNECOLOGICAL EXAMINATION: Primary | ICD-10-CM

## 2023-08-03 DIAGNOSIS — Z12.11 SPECIAL SCREENING FOR MALIGNANT NEOPLASM OF COLON: ICD-10-CM

## 2023-08-03 NOTE — PROGRESS NOTES
[address] [facility]etric[facility] Gyne[facility]  [address] [facility], [address] [place] [address],[address]      Zulema Sanabria  8/3/2023                       Primary Care Physician: Deisy Donohue MD    CC:   Chief Complaint   Patient presents with    Gynecologic Exam         HPI: Zulema Sanabria is a 77 y.o. female [id number] No LMP recorded. Patient is postmenopausal.    The patient was seen and examined. She is here for an annual visit. She is complaining of nothing. Patient is menopausal and denies any bleeding since that time. Her bowel habits are regular. She denies any bloating. She denies dysuria. She denies urinary leaking. She denies vaginal discharge. She is not sexually active.      Depression Screen: Symptoms of decreased mood absent  Symptoms of anhedonia absent  **If either question is answered in a  positive fashion then complete the PHQ9 Scoring Evaluation and make the appropriate referral**    REVIEW OF SYSTEMS:   Constitutional: negative fever, negative chills  HEENT: negative visual disturbances, negative headaches  Respiratory: negative dyspnea, negative cough  Cardiovascular: negative chest pain,  negative palpitations  Gastrointestinal: negative abdominal pain, negative RUQ pain, negative N/V, negative diarrhea, negative constipation  Genitourinary: negative dysuria, negative vaginal discharge  Dermatological: negative rash  Hematologic: negative bruising  Immunologic/Lymphatic: negative recent illness, negative recent sick contact  Musculoskeletal: negative back pain, negative myalgias, negative arthralgias  Neurological:  negative dizziness, negative weakness  Behavior/Psych: negative depression, negative anxiety      GYNECOLOGICAL HISTORY:  Age of Menarche: 13  Age of Menopause: 44     STD History: no past history    Permanent Sterilization: no  Reversible Birth Control: no  Hormone Replacement Exposure: no    OBSTETRICAL HISTORY:  OB History    Para Term  AB Living   12 10

## 2023-08-07 LAB
HPV I/H RISK 4 DNA CVX QL NAA+PROBE: NOT DETECTED
HPV SAMPLE: NORMAL
HPV, INTERPRETATION: NORMAL
HPV16 DNA CVX QL NAA+PROBE: NOT DETECTED
HPV18 DNA CVX QL NAA+PROBE: NOT DETECTED
SPECIMEN DESCRIPTION: NORMAL

## 2023-08-10 LAB — CYTOLOGY REPORT: NORMAL

## 2023-09-01 ENCOUNTER — HOSPITAL ENCOUNTER (OUTPATIENT)
Age: 68
Setting detail: SPECIMEN
Discharge: HOME | End: 2023-09-01

## 2023-09-01 LAB
ALBUMIN SERPL-MCNC: 4 G/DL (ref 3.5–5.2)
ALBUMIN/GLOB SERPL: 1.1 {RATIO} (ref 1–2.5)
ALP SERPL-CCNC: 86 U/L (ref 35–104)
ALT SERPL-CCNC: 14 U/L (ref 5–33)
ANION GAP SERPL CALCULATED.3IONS-SCNC: 15 MMOL/L (ref 9–17)
AST SERPL-CCNC: 16 U/L
BILIRUB SERPL-MCNC: 0.2 MG/DL (ref 0.3–1.2)
BUN SERPL-MCNC: 15 MG/DL (ref 8–23)
CALCIUM SERPL-MCNC: 9.2 MG/DL (ref 8.6–10.4)
CHLORIDE SERPL-SCNC: 103 MMOL/L (ref 98–107)
CO2 SERPL-SCNC: 22 MMOL/L (ref 20–31)
CORTIS SERPL-MCNC: 21.3 UG/DL (ref 2.7–18.4)
CORTISOL COLLECTION INFO: ABNORMAL
CREAT SERPL-MCNC: 0.7 MG/DL (ref 0.5–0.9)
FSH SERPL-ACNC: 74.5 MIU/ML (ref 25.8–134.8)
GFR SERPL CREATININE-BSD FRML MDRD: >60 ML/MIN/1.73M2
GLUCOSE SERPL-MCNC: 149 MG/DL (ref 70–99)
POTASSIUM SERPL-SCNC: 4.2 MMOL/L (ref 3.7–5.3)
PROT SERPL-MCNC: 7.6 G/DL (ref 6.4–8.3)
SODIUM SERPL-SCNC: 140 MMOL/L (ref 135–144)
T4 FREE SERPL-MCNC: 1 NG/DL (ref 0.9–1.7)

## 2023-09-02 LAB — T3FREE SERPL-MCNC: 2.86 PG/ML (ref 2.02–4.43)

## 2023-09-05 LAB
ACTH PLAS-MCNC: <5 PG/ML (ref 6–58)
DHEA-S SERPL-MCNC: 90.3 UG/DL (ref 10–90)

## 2023-09-21 ENCOUNTER — APPOINTMENT (OUTPATIENT)
Dept: MAMMOGRAPHY | Age: 68
End: 2023-09-21
Attending: STUDENT IN AN ORGANIZED HEALTH CARE EDUCATION/TRAINING PROGRAM
Payer: MEDICARE

## 2023-09-21 ENCOUNTER — HOSPITAL ENCOUNTER (OUTPATIENT)
Dept: MAMMOGRAPHY | Age: 68
Discharge: HOME | End: 2023-09-23
Attending: STUDENT IN AN ORGANIZED HEALTH CARE EDUCATION/TRAINING PROGRAM
Payer: MEDICARE

## 2023-09-21 VITALS — BODY MASS INDEX: 30.12 KG/M2 | WEIGHT: 170 LBS | HEIGHT: 63 IN

## 2023-09-21 DIAGNOSIS — Z12.31 ENCOUNTER FOR SCREENING MAMMOGRAM FOR MALIGNANT NEOPLASM OF BREAST: ICD-10-CM

## 2023-09-21 PROCEDURE — 77063 BREAST TOMOSYNTHESIS BI: CPT

## 2024-02-12 ENCOUNTER — HOSPITAL ENCOUNTER (OUTPATIENT)
Dept: GENERAL RADIOLOGY | Age: 69
Discharge: HOME | End: 2024-02-14
Payer: MEDICARE

## 2024-02-12 ENCOUNTER — HOSPITAL ENCOUNTER (OUTPATIENT)
Age: 69
Discharge: HOME | End: 2024-02-14
Payer: MEDICARE

## 2024-02-12 DIAGNOSIS — N20.0 CALCULUS OF KIDNEY: ICD-10-CM

## 2024-02-12 PROCEDURE — 74018 RADEX ABDOMEN 1 VIEW: CPT

## 2024-03-11 ENCOUNTER — HOSPITAL ENCOUNTER (OUTPATIENT)
Dept: MAMMOGRAPHY | Age: 69
Discharge: HOME | End: 2024-03-13
Attending: STUDENT IN AN ORGANIZED HEALTH CARE EDUCATION/TRAINING PROGRAM
Payer: MEDICARE

## 2024-03-11 DIAGNOSIS — Z78.0 POSTMENOPAUSAL: ICD-10-CM

## 2024-03-11 PROCEDURE — 77080 DXA BONE DENSITY AXIAL: CPT

## 2024-04-05 ENCOUNTER — HOSPITAL ENCOUNTER (OUTPATIENT)
Age: 69
Setting detail: SPECIMEN
Discharge: HOME | End: 2024-04-05

## 2024-04-05 LAB
ALBUMIN SERPL-MCNC: 4.4 G/DL (ref 3.5–5.2)
ALBUMIN/GLOB SERPL: 1 {RATIO} (ref 1–2.5)
ALP SERPL-CCNC: 89 U/L (ref 35–104)
ALT SERPL-CCNC: 16 U/L (ref 10–35)
ANION GAP SERPL CALCULATED.3IONS-SCNC: 9 MMOL/L (ref 9–16)
AST SERPL-CCNC: 22 U/L (ref 10–35)
BILIRUB SERPL-MCNC: 0.3 MG/DL (ref 0–1.2)
BUN SERPL-MCNC: 14 MG/DL (ref 8–23)
CALCIUM SERPL-MCNC: 9.5 MG/DL (ref 8.6–10.4)
CHLORIDE SERPL-SCNC: 104 MMOL/L (ref 98–107)
CO2 SERPL-SCNC: 27 MMOL/L (ref 20–31)
CORTIS SERPL-MCNC: 20.9 UG/DL (ref 2.5–19.5)
CORTISOL COLLECTION INFO: ABNORMAL
CREAT SERPL-MCNC: 0.8 MG/DL (ref 0.5–0.9)
FSH SERPL-ACNC: 66.7 MIU/ML
GFR SERPL CREATININE-BSD FRML MDRD: 86 ML/MIN/1.73M2
GLUCOSE SERPL-MCNC: 132 MG/DL (ref 74–99)
POTASSIUM SERPL-SCNC: 4.8 MMOL/L (ref 3.7–5.3)
PROT SERPL-MCNC: 7.8 G/DL (ref 6.6–8.7)
SODIUM SERPL-SCNC: 140 MMOL/L (ref 136–145)
T3FREE SERPL-MCNC: 2.8 PG/ML (ref 2–4.4)
T4 FREE SERPL-MCNC: 1.2 NG/DL (ref 0.92–1.68)

## 2024-04-06 LAB
ACTH PLAS-MCNC: 12 PG/ML (ref 7–63)
DHEA-S SERPL-MCNC: 108 UG/DL (ref 9.4–246)

## 2024-06-14 ENCOUNTER — HOSPITAL ENCOUNTER (OUTPATIENT)
Age: 69
Setting detail: SPECIMEN
Discharge: HOME | End: 2024-06-14

## 2024-06-14 LAB
25(OH)D3 SERPL-MCNC: 80.7 NG/ML (ref 30–100)
ACTH PLAS-MCNC: 11 PG/ML (ref 7–63)
ALBUMIN SERPL-MCNC: 4.2 G/DL (ref 3.5–5.2)
ALBUMIN/GLOB SERPL: 1 {RATIO} (ref 1–2.5)
ALP SERPL-CCNC: 96 U/L (ref 35–104)
ALT SERPL-CCNC: 13 U/L (ref 10–35)
ANION GAP SERPL CALCULATED.3IONS-SCNC: 11 MMOL/L (ref 9–16)
AST SERPL-CCNC: 20 U/L (ref 10–35)
BASOPHILS # BLD: <0.03 K/UL (ref 0–0.2)
BASOPHILS NFR BLD: 0 % (ref 0–2)
BILIRUB SERPL-MCNC: 0.3 MG/DL (ref 0–1.2)
BILIRUB UR QL STRIP: NEGATIVE
BUN SERPL-MCNC: 16 MG/DL (ref 8–23)
CALCIUM SERPL-MCNC: 9.2 MG/DL (ref 8.6–10.4)
CHLORIDE SERPL-SCNC: 104 MMOL/L (ref 98–107)
CHOLEST SERPL-MCNC: 219 MG/DL (ref 0–199)
CHOLESTEROL/HDL RATIO: 5
CLARITY UR: CLEAR
CO2 SERPL-SCNC: 26 MMOL/L (ref 20–31)
COLOR UR: YELLOW
COMMENT: NORMAL
CORTIS SERPL-MCNC: 14.5 UG/DL (ref 2.5–19.5)
CORTISOL COLLECTION INFO: NORMAL
CREAT SERPL-MCNC: 0.8 MG/DL (ref 0.5–0.9)
CRP SERPL HS-MCNC: 6.4 MG/L (ref 0–3)
EOSINOPHIL # BLD: 0.04 K/UL (ref 0–0.44)
EOSINOPHILS RELATIVE PERCENT: 1 % (ref 1–4)
ERYTHROCYTE [DISTWIDTH] IN BLOOD BY AUTOMATED COUNT: 13.1 % (ref 11.8–14.4)
ERYTHROCYTE [SEDIMENTATION RATE] IN BLOOD BY PHOTOMETRIC METHOD: 28 MM/HR (ref 0–30)
ESTRADIOL LEVEL: 74 PG/ML
FOLATE SERPL-MCNC: >20 NG/ML (ref 4.8–24.2)
FSH SERPL-ACNC: 71.4 MIU/ML
GFR, ESTIMATED: 83 ML/MIN/1.73M2
GLUCOSE SERPL-MCNC: 105 MG/DL (ref 74–99)
GLUCOSE UR STRIP-MCNC: NEGATIVE MG/DL
HCT VFR BLD AUTO: 43.7 % (ref 36.3–47.1)
HDLC SERPL-MCNC: 47 MG/DL
HGB BLD-MCNC: 13.8 G/DL (ref 11.9–15.1)
HGB UR QL STRIP.AUTO: NEGATIVE
IMM GRANULOCYTES # BLD AUTO: <0.03 K/UL (ref 0–0.3)
IMM GRANULOCYTES NFR BLD: 0 %
IRON SATN MFR SERPL: 16 % (ref 20–55)
IRON SERPL-MCNC: 51 UG/DL (ref 37–145)
KETONES UR STRIP-MCNC: NEGATIVE MG/DL
LDLC SERPL CALC-MCNC: 149 MG/DL (ref 0–100)
LEUKOCYTE ESTERASE UR QL STRIP: NEGATIVE
LH SERPL-ACNC: 34.1 MIU/ML (ref 1.7–8.6)
LYMPHOCYTES NFR BLD: 1.13 K/UL (ref 1.1–3.7)
LYMPHOCYTES RELATIVE PERCENT: 18 % (ref 24–43)
MCH RBC QN AUTO: 29.1 PG (ref 25.2–33.5)
MCHC RBC AUTO-ENTMCNC: 31.6 G/DL (ref 28.4–34.8)
MCV RBC AUTO: 92 FL (ref 82.6–102.9)
MONOCYTES NFR BLD: 0.59 K/UL (ref 0.1–1.2)
MONOCYTES NFR BLD: 10 % (ref 3–12)
NEUTROPHILS NFR BLD: 71 % (ref 36–65)
NEUTS SEG NFR BLD: 4.4 K/UL (ref 1.5–8.1)
NITRITE UR QL STRIP: NEGATIVE
NRBC BLD-RTO: 0 PER 100 WBC
PH UR STRIP: 6.5 [PH] (ref 5–8)
PLATELET # BLD AUTO: 204 K/UL (ref 138–453)
PMV BLD AUTO: 10.9 FL (ref 8.1–13.5)
POTASSIUM SERPL-SCNC: 4.5 MMOL/L (ref 3.7–5.3)
PROGEST SERPL-MCNC: 11.5 NG/ML
PROT SERPL-MCNC: 8 G/DL (ref 6.6–8.7)
RBC # BLD AUTO: 4.75 M/UL (ref 3.95–5.11)
SHBG SERPL-SCNC: 44 NMOL/L (ref 17–125)
SODIUM SERPL-SCNC: 141 MMOL/L (ref 136–145)
SP GR UR STRIP: 1.01 (ref 1–1.03)
T3FREE SERPL-MCNC: 2.9 PG/ML (ref 2–4.4)
T4 FREE SERPL-MCNC: 1.2 NG/DL (ref 0.92–1.68)
TESTOST FREE MFR SERPL: 2.8 PG/ML (ref 0.6–3.8)
TESTOST SERPL-MCNC: 19 NG/DL (ref 3–41)
TESTOSTERONE, BIOAVAILABLE: 6.6 NG/DL (ref 1.5–9.4)
TIBC SERPL-MCNC: 313 UG/DL (ref 250–450)
TRIGL SERPL-MCNC: 113 MG/DL
TSH SERPL DL<=0.05 MIU/L-ACNC: 2.43 UIU/ML (ref 0.27–4.2)
UNSATURATED IRON BINDING CAPACITY: 262 UG/DL (ref 112–347)
URATE SERPL-MCNC: 5.3 MG/DL (ref 2.4–5.7)
UROBILINOGEN UR STRIP-ACNC: NORMAL EU/DL (ref 0–1)
VIT B12 SERPL-MCNC: >2000 PG/ML (ref 232–1245)
VLDLC SERPL CALC-MCNC: 23 MG/DL
WBC OTHER # BLD: 6.2 K/UL (ref 3.5–11.3)

## 2024-08-08 ENCOUNTER — HOSPITAL ENCOUNTER (OUTPATIENT)
Age: 69
Setting detail: SPECIMEN
Discharge: HOME | End: 2024-08-08

## 2024-08-08 ENCOUNTER — OFFICE VISIT (OUTPATIENT)
Dept: OBGYN CLINIC | Age: 69
End: 2024-08-08
Payer: MEDICARE

## 2024-08-08 VITALS
BODY MASS INDEX: 31.18 KG/M2 | WEIGHT: 176 LBS | SYSTOLIC BLOOD PRESSURE: 180 MMHG | HEART RATE: 81 BPM | DIASTOLIC BLOOD PRESSURE: 120 MMHG | HEIGHT: 63 IN

## 2024-08-08 DIAGNOSIS — Z12.11 SPECIAL SCREENING FOR MALIGNANT NEOPLASM OF COLON: ICD-10-CM

## 2024-08-08 DIAGNOSIS — Z01.419 WOMEN'S ANNUAL ROUTINE GYNECOLOGICAL EXAMINATION: Primary | ICD-10-CM

## 2024-08-08 DIAGNOSIS — Z12.31 ENCOUNTER FOR SCREENING MAMMOGRAM FOR MALIGNANT NEOPLASM OF BREAST: ICD-10-CM

## 2024-08-08 PROCEDURE — G8427 DOCREV CUR MEDS BY ELIG CLIN: HCPCS | Performed by: STUDENT IN AN ORGANIZED HEALTH CARE EDUCATION/TRAINING PROGRAM

## 2024-08-08 PROCEDURE — G0101 CA SCREEN;PELVIC/BREAST EXAM: HCPCS | Performed by: STUDENT IN AN ORGANIZED HEALTH CARE EDUCATION/TRAINING PROGRAM

## 2024-08-08 PROCEDURE — G8417 CALC BMI ABV UP PARAM F/U: HCPCS | Performed by: STUDENT IN AN ORGANIZED HEALTH CARE EDUCATION/TRAINING PROGRAM

## 2024-08-08 NOTE — PROGRESS NOTES
[facility]  [address]  [address]  [place] [address]      Leeann Soler  2026                       Primary Care Physician: Yasmeen Lopez MD    CC:   Chief Complaint   Patient presents with    Annual Exam         HPI: Leeann Soler is a 69 y.o. female  No LMP recorded. Patient is postmenopausal.    The patient was seen and examined. She is here for an annual visit. She is complaining of nothing.     Patient is menopausal and denies any bleeding since that time.     Her bowel habits are regular. She denies any bloating.  She denies dysuria. She denies urinary leaking.  She denies vaginal discharge.      Depression Screen: Symptoms of decreased mood absent  Symptoms of anhedonia absent  **If either question is answered in a  positive fashion then complete the PHQ9 Scoring Evaluation and make the appropriate referral**    REVIEW OF SYSTEMS:   Constitutional: negative fever, negative chills  HEENT: negative visual disturbances, negative headaches  Respiratory: negative dyspnea, negative cough  Cardiovascular: negative chest pain,  negative palpitations  Gastrointestinal: negative abdominal pain, negative RUQ pain, negative N/V, negative diarrhea, negative constipation  Genitourinary: negative dysuria, negative vaginal discharge  Dermatological: negative rash  Hematologic: negative bruising  Immunologic/Lymphatic: negative recent illness, negative recent sick contact  Musculoskeletal: negative back pain, negative myalgias, negative arthralgias  Neurological:  negative dizziness, negative weakness  Behavior/Psych: negative depression, negative anxiety      GYNECOLOGICAL HISTORY:  Age of Menarche: 10  Age of Menopause: 50     STD History: no past history    Permanent Sterilization: no  Reversible Birth Control: no  Hormone Replacement Exposure: no    OBSTETRICAL HISTORY:  OB History    Para Term  AB Living   12 10 5 0 2 0   SAB IAB

## 2024-08-16 LAB — CYTOLOGY REPORT: NORMAL

## 2024-10-15 ENCOUNTER — HOSPITAL ENCOUNTER (OUTPATIENT)
Dept: MAMMOGRAPHY | Age: 69
Discharge: HOME | End: 2024-10-17
Attending: STUDENT IN AN ORGANIZED HEALTH CARE EDUCATION/TRAINING PROGRAM
Payer: MEDICARE

## 2024-10-15 VITALS — WEIGHT: 173 LBS | BODY MASS INDEX: 30.65 KG/M2

## 2024-10-15 DIAGNOSIS — Z12.31 ENCOUNTER FOR SCREENING MAMMOGRAM FOR MALIGNANT NEOPLASM OF BREAST: ICD-10-CM

## 2024-10-15 PROCEDURE — 77063 BREAST TOMOSYNTHESIS BI: CPT

## 2024-11-22 ENCOUNTER — HOSPITAL ENCOUNTER (OUTPATIENT)
Age: 69
Discharge: HOME | End: 2024-11-22
Attending: FAMILY MEDICINE
Payer: MEDICARE

## 2024-11-22 VITALS — HEIGHT: 63 IN | BODY MASS INDEX: 30.65 KG/M2 | WEIGHT: 173 LBS

## 2024-11-22 DIAGNOSIS — I35.0 NONRHEUMATIC AORTIC VALVE STENOSIS: ICD-10-CM

## 2024-11-22 LAB
ECHO AO ASC DIAM: 3.8 CM
ECHO AO ASCENDING AORTA INDEX: 2.09 CM/M2
ECHO AO ROOT DIAM: 3.5 CM
ECHO AO ROOT INDEX: 1.92 CM/M2
ECHO AR MAX VEL PISA: 4.7 M/S
ECHO AV AREA PEAK VELOCITY: 3 CM2
ECHO AV AREA VTI: 3.3 CM2
ECHO AV AREA/BSA PEAK VELOCITY: 1.6 CM2/M2
ECHO AV AREA/BSA VTI: 1.8 CM2/M2
ECHO AV MEAN GRADIENT: 5 MMHG
ECHO AV MEAN VELOCITY: 1 M/S
ECHO AV PEAK GRADIENT: 9 MMHG
ECHO AV PEAK VELOCITY: 1.5 M/S
ECHO AV REGURGITANT PHT: 441 MS
ECHO AV VELOCITY RATIO: 0.8
ECHO AV VTI: 28.7 CM
ECHO BSA: 1.87 M2
ECHO EST RA PRESSURE: 3 MMHG
ECHO IVC EXP: 1.6 CM
ECHO IVC INSP: 0.5 CM
ECHO LA AREA 2C: 20.4 CM2
ECHO LA AREA 4C: 18.7 CM2
ECHO LA DIAMETER INDEX: 2.03 CM/M2
ECHO LA DIAMETER: 3.7 CM
ECHO LA MAJOR AXIS: 5.3 CM
ECHO LA MINOR AXIS: 5.5 CM
ECHO LA TO AORTIC ROOT RATIO: 1.06
ECHO LA VOL BP: 57 ML (ref 22–52)
ECHO LA VOL MOD A2C: 63 ML (ref 22–52)
ECHO LA VOL MOD A4C: 50 ML (ref 22–52)
ECHO LA VOL/BSA BIPLANE: 31 ML/M2 (ref 16–34)
ECHO LA VOLUME INDEX MOD A2C: 35 ML/M2 (ref 16–34)
ECHO LA VOLUME INDEX MOD A4C: 27 ML/M2 (ref 16–34)
ECHO LV E' LATERAL VELOCITY: 6.85 CM/S
ECHO LV E' SEPTAL VELOCITY: 3.92 CM/S
ECHO LV EF PHYSICIAN: 65 %
ECHO LV FRACTIONAL SHORTENING: 38 % (ref 28–44)
ECHO LV INTERNAL DIMENSION DIASTOLE INDEX: 2.47 CM/M2
ECHO LV INTERNAL DIMENSION DIASTOLIC: 4.5 CM (ref 3.9–5.3)
ECHO LV INTERNAL DIMENSION SYSTOLIC INDEX: 1.54 CM/M2
ECHO LV INTERNAL DIMENSION SYSTOLIC: 2.8 CM
ECHO LV IVSD: 1 CM (ref 0.6–0.9)
ECHO LV MASS 2D: 153.3 G (ref 67–162)
ECHO LV MASS INDEX 2D: 84.2 G/M2 (ref 43–95)
ECHO LV POSTERIOR WALL DIASTOLIC: 1 CM (ref 0.6–0.9)
ECHO LV RELATIVE WALL THICKNESS RATIO: 0.44
ECHO LVOT AREA: 3.8 CM2
ECHO LVOT AV VTI INDEX: 0.88
ECHO LVOT DIAM: 2.2 CM
ECHO LVOT MEAN GRADIENT: 3 MMHG
ECHO LVOT PEAK GRADIENT: 6 MMHG
ECHO LVOT PEAK VELOCITY: 1.2 M/S
ECHO LVOT STROKE VOLUME INDEX: 52.6 ML/M2
ECHO LVOT SV: 95.7 ML
ECHO LVOT VTI: 25.2 CM
ECHO MV A VELOCITY: 0.8 M/S
ECHO MV E VELOCITY: 0.49 M/S
ECHO MV E/A RATIO: 0.61
ECHO MV E/E' LATERAL: 7.15
ECHO MV E/E' RATIO (AVERAGED): 9.83
ECHO MV E/E' SEPTAL: 12.5
ECHO RIGHT VENTRICULAR SYSTOLIC PRESSURE (RVSP): 23 MMHG
ECHO RV BASAL DIMENSION: 2.9 CM
ECHO RV FREE WALL PEAK S': 12.5 CM/S
ECHO TV REGURGITANT MAX VELOCITY: 2.26 M/S
ECHO TV REGURGITANT PEAK GRADIENT: 20 MMHG

## 2024-11-22 PROCEDURE — 93306 TTE W/DOPPLER COMPLETE: CPT

## 2024-11-22 PROCEDURE — 93306 TTE W/DOPPLER COMPLETE: CPT | Performed by: INTERNAL MEDICINE

## 2024-12-03 ENCOUNTER — HOSPITAL ENCOUNTER (OUTPATIENT)
Age: 69
Setting detail: SPECIMEN
Discharge: HOME | End: 2024-12-03

## 2024-12-03 LAB
ACTH PLAS-MCNC: 14 PG/ML (ref 7–63)
BNP SERPL-MCNC: 84 PG/ML (ref 0–125)
CORTIS SERPL-MCNC: 19.7 UG/DL (ref 2.5–19.5)
CORTISOL COLLECTION INFO: ABNORMAL
T3FREE SERPL-MCNC: 3.3 PG/ML (ref 2–4.4)
T4 FREE SERPL-MCNC: 1.1 NG/DL (ref 0.92–1.68)
TSH SERPL DL<=0.05 MIU/L-ACNC: 2.44 UIU/ML (ref 0.27–4.2)

## 2024-12-06 LAB
COLLECT DURATION TIME SPEC: NORMAL H
CORTISOL (UR), FREE: NORMAL UG/D
CORTISOL URINE, FREE (/G CRT): 46.4 UG/L
CORTISOL, URINE RATIO CREATININE: 125.41 UG/G CRT
CORTISOL, URINE: NORMAL
CREAT 24H UR-MCNC: 37 MG/DL
CREATININE URINE /24 HR: NORMAL MG/D (ref 500–1400)
SPECIMEN VOL ?TM UR: NORMAL ML

## 2024-12-19 RX ORDER — PROPARACAINE HYDROCHLORIDE 5 MG/ML
1 SOLUTION/ DROPS OPHTHALMIC EVERY 5 MIN PRN
Status: CANCELLED | OUTPATIENT
Start: 2025-01-13

## 2024-12-27 ENCOUNTER — HOSPITAL ENCOUNTER (OUTPATIENT)
Age: 69
Setting detail: SPECIMEN
Discharge: HOME | End: 2024-12-27

## 2024-12-27 LAB
ACTH PLAS-MCNC: 7 PG/ML (ref 7–63)
ALBUMIN SERPL-MCNC: 4.1 G/DL (ref 3.5–5.2)
ALBUMIN/GLOB SERPL: 1.2 {RATIO} (ref 1–2.5)
ALP SERPL-CCNC: 95 U/L (ref 35–104)
ALT SERPL-CCNC: 13 U/L (ref 10–35)
ANION GAP SERPL CALCULATED.3IONS-SCNC: 12 MMOL/L (ref 9–16)
AST SERPL-CCNC: 17 U/L (ref 10–35)
BASOPHILS # BLD: 0.03 K/UL (ref 0–0.2)
BASOPHILS NFR BLD: 1 % (ref 0–2)
BILIRUB SERPL-MCNC: 0.2 MG/DL (ref 0–1.2)
BNP SERPL-MCNC: 137 PG/ML (ref 0–125)
BUN SERPL-MCNC: 15 MG/DL (ref 8–23)
CALCIUM SERPL-MCNC: 9.3 MG/DL (ref 8.6–10.4)
CHLORIDE SERPL-SCNC: 103 MMOL/L (ref 98–107)
CO2 SERPL-SCNC: 24 MMOL/L (ref 20–31)
CORTIS SERPL-MCNC: 17 UG/DL (ref 2.5–19.5)
CORTISOL (UR), FREE: NORMAL
CORTISOL COLLECTION INFO: NORMAL
CORTISOL URINE, FREE (/G CRT): NORMAL
CORTISOL, URINE RATIO CREATININE: NORMAL
CORTISOL, URINE: NORMAL
CREAT 24H UR-MCNC: NORMAL MG/DL
CREAT SERPL-MCNC: 0.7 MG/DL (ref 0.6–0.9)
CREATININE URINE /24 HR: NORMAL
CRP SERPL HS-MCNC: 7.1 MG/L (ref 0–5)
DHEA-S SERPL-MCNC: 135 UG/DL (ref 9.4–246)
EOSINOPHIL # BLD: 0.09 K/UL (ref 0–0.44)
EOSINOPHILS RELATIVE PERCENT: 1 % (ref 1–4)
ERYTHROCYTE [DISTWIDTH] IN BLOOD BY AUTOMATED COUNT: 13.2 % (ref 11.8–14.4)
ESTRADIOL LEVEL: 28.4 PG/ML
FERRITIN SERPL-MCNC: 145 NG/ML
GFR, ESTIMATED: >90 ML/MIN/1.73M2
GLUCOSE SERPL-MCNC: 145 MG/DL (ref 74–99)
HCT VFR BLD AUTO: 42.5 % (ref 36.3–47.1)
HGB BLD-MCNC: 13.3 G/DL (ref 11.9–15.1)
IMM GRANULOCYTES # BLD AUTO: 0.04 K/UL (ref 0–0.3)
IMM GRANULOCYTES NFR BLD: 1 %
LYMPHOCYTES NFR BLD: 1.34 K/UL (ref 1.1–3.7)
LYMPHOCYTES RELATIVE PERCENT: 21 % (ref 24–43)
MCH RBC QN AUTO: 28.5 PG (ref 25.2–33.5)
MCHC RBC AUTO-ENTMCNC: 31.3 G/DL (ref 28.4–34.8)
MCV RBC AUTO: 91.2 FL (ref 82.6–102.9)
MONOCYTES NFR BLD: 0.64 K/UL (ref 0.1–1.2)
MONOCYTES NFR BLD: 10 % (ref 3–12)
NEUTROPHILS NFR BLD: 66 % (ref 36–65)
NEUTS SEG NFR BLD: 4.32 K/UL (ref 1.5–8.1)
NRBC BLD-RTO: 0 PER 100 WBC
PLATELET # BLD AUTO: 214 K/UL (ref 138–453)
PMV BLD AUTO: 10.7 FL (ref 8.1–13.5)
POTASSIUM SERPL-SCNC: 4.5 MMOL/L (ref 3.7–5.3)
PROGEST SERPL-MCNC: 6.34 NG/ML
PROT SERPL-MCNC: 7.6 G/DL (ref 6.6–8.7)
RBC # BLD AUTO: 4.66 M/UL (ref 3.95–5.11)
SHBG SERPL-SCNC: 39 NMOL/L (ref 17–125)
SODIUM SERPL-SCNC: 139 MMOL/L (ref 136–145)
SPECIMEN VOL ?TM UR: NORMAL ML
T3FREE SERPL-MCNC: 2.88 PG/ML (ref 2–4.4)
T4 FREE SERPL-MCNC: 1.1 NG/DL (ref 0.92–1.68)
TESTOST FREE MFR SERPL: 3.2 PG/ML (ref 0.6–3.8)
TESTOST SERPL-MCNC: 20 NG/DL (ref 3–41)
TSH SERPL DL<=0.05 MIU/L-ACNC: 2.82 UIU/ML (ref 0.27–4.2)
URATE SERPL-MCNC: 5.6 MG/DL (ref 2.4–5.7)
VIT B12 SERPL-MCNC: >2000 PG/ML (ref 232–1245)
WBC OTHER # BLD: 6.5 K/UL (ref 3.5–11.3)

## 2024-12-30 ENCOUNTER — HOSPITAL ENCOUNTER (OUTPATIENT)
Dept: PREADMISSION TESTING | Age: 69
Discharge: HOME | End: 2025-01-03

## 2024-12-30 VITALS — HEIGHT: 63 IN | WEIGHT: 173 LBS | BODY MASS INDEX: 30.65 KG/M2

## 2024-12-30 LAB
ANA SER QL IA: ABNORMAL
DSDNA IGG SER QL IA: 1.8 IU/ML
NUCLEAR IGG SER IA-RTO: 0.7 U/ML
THYROGLOBULIN AB: 22 IU/ML (ref 0–40)
THYROPEROXIDASE AB SERPL IA-ACNC: 4.7 IU/ML (ref 0–25)

## 2024-12-30 NOTE — PROGRESS NOTES
Pre-op Instructions For Out-Patient Surgery    Medication Instructions:  Please stop herbs and any supplements now (includes vitamins and minerals).    For these medications:  Dulaglutide (Trulicity), Exenatide (Byetta and Bydureon, Liraglutide (Victoza), Lixisenatide (Adlyxin), Semaglutide (Ozempic and Rybelsus), Tirzepatide (Mounjaro)- Stop 1 week prior if taking weekly or 1 day prior if taking every 12 hours or daily.     Please contact your surgeon and prescribing physician for pre-op instructions for any blood thinners. Stop Aspirin as directed.     If you have inhalers/aerosol treatments at home, please use them the morning of your surgery and bring the inhalers with you to the hospital.    Please take the following medications the morning of your surgery with a sip of water:    Metoprolol     Surgery Instructions:  After midnight before surgery:  Do not eat or drink anything, including water, mints, gum, and hard candy.  You may brush your teeth without swallowing.      Please shower or bathe before surgery.      Please do not wear any lotion, powder, deodorant, jewelry, piercings, perfume, makeup, nail polish, hair accessories, or hair spray on the day of surgery.  Wear loose comfortable clothing.    Leave your valuables at home but bring a payment source for any after-surgery prescriptions you plan to fill at [facility].  Bring a storage case for any glasses/contacts.    An adult who is responsible for you MUST drive you home and should be with you for the first 24 hours after surgery.      The Day of Surgery: 1/13/25 @ 9:45 am    Arrive at [facility] Surgery Entrance at 7:45 am and check in at the desk.     If you have a living will or healthcare power of , please bring a copy.    You will be taken to the pre-op holding area where you will be prepared for surgery.  A physical assessment will be performed by a nurse practitioner or house officer.

## 2024-12-31 LAB
CORTISOL (UR), FREE: NORMAL UG/D
CORTISOL URINE, FREE (/G CRT): 19.5 UG/L
CORTISOL, URINE RATIO CREATININE: 57.35 UG/G CRT
CORTISOL, URINE: NORMAL
CREAT 24H UR-MCNC: 34 MG/DL
CREATININE URINE /24 HR: NORMAL MG/D (ref 500–1400)
SPECIMEN VOL ?TM UR: NORMAL ML

## 2025-01-01 LAB — PREG SERPL-MCNC: 73 NG/DL (ref 15–132)

## 2025-01-10 ENCOUNTER — ANESTHESIA EVENT (OUTPATIENT)
Dept: OPERATING ROOM | Age: 70
End: 2025-01-10
Payer: MEDICARE

## 2025-01-13 ENCOUNTER — HOSPITAL ENCOUNTER (OUTPATIENT)
Age: 70
Setting detail: OUTPATIENT SURGERY
Discharge: HOME | End: 2025-01-13
Attending: OPHTHALMOLOGY | Admitting: OPHTHALMOLOGY
Payer: MEDICARE

## 2025-01-13 ENCOUNTER — ANESTHESIA (OUTPATIENT)
Dept: OPERATING ROOM | Age: 70
End: 2025-01-13
Payer: MEDICARE

## 2025-01-13 VITALS
TEMPERATURE: 97.3 F | HEIGHT: 63 IN | HEART RATE: 66 BPM | WEIGHT: 173 LBS | OXYGEN SATURATION: 99 % | SYSTOLIC BLOOD PRESSURE: 167 MMHG | RESPIRATION RATE: 16 BRPM | DIASTOLIC BLOOD PRESSURE: 83 MMHG | BODY MASS INDEX: 30.65 KG/M2

## 2025-01-13 PROCEDURE — 6360000002 HC RX W HCPCS: Performed by: OPHTHALMOLOGY

## 2025-01-13 PROCEDURE — 7100000010 HC PHASE II RECOVERY - FIRST 15 MIN: Performed by: OPHTHALMOLOGY

## 2025-01-13 PROCEDURE — V2632 POST CHMBR INTRAOCULAR LENS: HCPCS | Performed by: OPHTHALMOLOGY

## 2025-01-13 PROCEDURE — 6370000000 HC RX 637 (ALT 250 FOR IP): Performed by: OPHTHALMOLOGY

## 2025-01-13 PROCEDURE — 6360000002 HC RX W HCPCS: Performed by: ANESTHESIOLOGY

## 2025-01-13 PROCEDURE — 7100000011 HC PHASE II RECOVERY - ADDTL 15 MIN: Performed by: OPHTHALMOLOGY

## 2025-01-13 PROCEDURE — 2500000003 HC RX 250 WO HCPCS: Performed by: OPHTHALMOLOGY

## 2025-01-13 PROCEDURE — 3600000002 HC SURGERY LEVEL 2 BASE: Performed by: OPHTHALMOLOGY

## 2025-01-13 PROCEDURE — 6360000002 HC RX W HCPCS: Performed by: NURSE ANESTHETIST, CERTIFIED REGISTERED

## 2025-01-13 PROCEDURE — 3700000001 HC ADD 15 MINUTES (ANESTHESIA): Performed by: OPHTHALMOLOGY

## 2025-01-13 PROCEDURE — 3600000012 HC SURGERY LEVEL 2 ADDTL 15MIN: Performed by: OPHTHALMOLOGY

## 2025-01-13 PROCEDURE — 2709999900 HC NON-CHARGEABLE SUPPLY: Performed by: OPHTHALMOLOGY

## 2025-01-13 PROCEDURE — 3700000000 HC ANESTHESIA ATTENDED CARE: Performed by: OPHTHALMOLOGY

## 2025-01-13 DEVICE — STERILE UV AND BLUE LIGHT FILTERING ACRYLIC FOLDABLE ASPHERIC POSTERIOR CHAMBER INTRAOCULAR LENS
Type: IMPLANTABLE DEVICE | Site: EYE | Status: FUNCTIONAL
Brand: CLAREON

## 2025-01-13 RX ORDER — SODIUM CHLORIDE 0.9 % (FLUSH) 0.9 %
5-40 SYRINGE (ML) INJECTION PRN
Status: DISCONTINUED | OUTPATIENT
Start: 2025-01-13 | End: 2025-01-13 | Stop reason: HOSPADM

## 2025-01-13 RX ORDER — SODIUM CHLORIDE 9 MG/ML
INJECTION, SOLUTION INTRAVENOUS PRN
Status: DISCONTINUED | OUTPATIENT
Start: 2025-01-13 | End: 2025-01-13 | Stop reason: HOSPADM

## 2025-01-13 RX ORDER — TETRACAINE HYDROCHLORIDE 5 MG/ML
SOLUTION OPHTHALMIC PRN
Status: DISCONTINUED | OUTPATIENT
Start: 2025-01-13 | End: 2025-01-13 | Stop reason: ALTCHOICE

## 2025-01-13 RX ORDER — MIDAZOLAM HYDROCHLORIDE 1 MG/ML
INJECTION, SOLUTION INTRAMUSCULAR; INTRAVENOUS
Status: DISCONTINUED | OUTPATIENT
Start: 2025-01-13 | End: 2025-01-13 | Stop reason: SDUPTHER

## 2025-01-13 RX ORDER — LIDOCAINE HYDROCHLORIDE 10 MG/ML
INJECTION, SOLUTION EPIDURAL; INFILTRATION; INTRACAUDAL; PERINEURAL PRN
Status: DISCONTINUED | OUTPATIENT
Start: 2025-01-13 | End: 2025-01-13 | Stop reason: ALTCHOICE

## 2025-01-13 RX ORDER — PROPARACAINE HYDROCHLORIDE 5 MG/ML
1 SOLUTION/ DROPS OPHTHALMIC EVERY 5 MIN PRN
Status: COMPLETED | OUTPATIENT
Start: 2025-01-13 | End: 2025-01-13

## 2025-01-13 RX ORDER — PREDNISOLONE ACETATE 10 MG/ML
SUSPENSION/ DROPS OPHTHALMIC PRN
Status: DISCONTINUED | OUTPATIENT
Start: 2025-01-13 | End: 2025-01-13 | Stop reason: ALTCHOICE

## 2025-01-13 RX ORDER — KETOROLAC TROMETHAMINE 5 MG/ML
1 SOLUTION OPHTHALMIC
Status: COMPLETED | OUTPATIENT
Start: 2025-01-13 | End: 2025-01-13

## 2025-01-13 RX ORDER — CIPROFLOXACIN HYDROCHLORIDE 3.5 MG/ML
SOLUTION/ DROPS TOPICAL PRN
Status: DISCONTINUED | OUTPATIENT
Start: 2025-01-13 | End: 2025-01-13 | Stop reason: ALTCHOICE

## 2025-01-13 RX ORDER — CYCLOPENTOLATE HYDROCHLORIDE 10 MG/ML
1 SOLUTION/ DROPS OPHTHALMIC PRN
Status: COMPLETED | OUTPATIENT
Start: 2025-01-13 | End: 2025-01-13

## 2025-01-13 RX ORDER — LIDOCAINE HYDROCHLORIDE 10 MG/ML
1 INJECTION, SOLUTION EPIDURAL; INFILTRATION; INTRACAUDAL; PERINEURAL
Status: COMPLETED | OUTPATIENT
Start: 2025-01-13 | End: 2025-01-13

## 2025-01-13 RX ORDER — SODIUM CHLORIDE 0.9 % (FLUSH) 0.9 %
5-40 SYRINGE (ML) INJECTION EVERY 12 HOURS SCHEDULED
Status: DISCONTINUED | OUTPATIENT
Start: 2025-01-13 | End: 2025-01-13 | Stop reason: HOSPADM

## 2025-01-13 RX ORDER — SODIUM CHLORIDE 9 MG/ML
INJECTION, SOLUTION INTRAVENOUS CONTINUOUS
Status: DISCONTINUED | OUTPATIENT
Start: 2025-01-13 | End: 2025-01-13 | Stop reason: HOSPADM

## 2025-01-13 RX ADMIN — Medication 1 DROP: at 08:50

## 2025-01-13 RX ADMIN — CYCLOPENTOLATE HYDROCHLORIDE 1 DROP: 10 SOLUTION OPHTHALMIC at 09:01

## 2025-01-13 RX ADMIN — Medication 1 DROP: at 08:56

## 2025-01-13 RX ADMIN — LIDOCAINE HYDROCHLORIDE 1 ML: 10 INJECTION, SOLUTION EPIDURAL; INFILTRATION; INTRACAUDAL; PERINEURAL at 09:03

## 2025-01-13 RX ADMIN — KETOROLAC TROMETHAMINE 1 DROP: 0.5 SOLUTION OPHTHALMIC at 08:50

## 2025-01-13 RX ADMIN — CYCLOPENTOLATE HYDROCHLORIDE 1 DROP: 10 SOLUTION OPHTHALMIC at 08:50

## 2025-01-13 RX ADMIN — Medication 1 DROP: at 09:01

## 2025-01-13 RX ADMIN — CYCLOPENTOLATE HYDROCHLORIDE 1 DROP: 10 SOLUTION OPHTHALMIC at 08:56

## 2025-01-13 RX ADMIN — MIDAZOLAM 1 MG: 1 INJECTION INTRAMUSCULAR; INTRAVENOUS at 09:40

## 2025-01-13 ASSESSMENT — ENCOUNTER SYMPTOMS
DIARRHEA: 1
WHEEZING: 1
BACK PAIN: 1
CONSTIPATION: 1

## 2025-01-13 ASSESSMENT — PAIN - FUNCTIONAL ASSESSMENT
PAIN_FUNCTIONAL_ASSESSMENT: 0-10
PAIN_FUNCTIONAL_ASSESSMENT: NONE - DENIES PAIN

## 2025-01-13 NOTE — H&P
place, and time.      Motor: No weakness.      Coordination: Coordination normal.      Gait: Gait abnormal (pt is using a cane for ambulation).   Psychiatric:         Behavior: Behavior normal.         Thought Content: Thought content normal.                   PROVISIONAL DIAGNOSES / SURGERY:      Nuclear sclerotic cataract of right eye [H25.11]    EYE CATARACT EMULSIFICATION INTRAOCULAR LENS IMPLANT RIGHT     Patient Active Problem List    Diagnosis Date Noted    Osteopenia 10/01/2012    History of cervical dysplasia 10/01/2012    GSI (genuine stress incontinence), female 10/01/2012    Fibroid 10/01/2012           HAL Brown - CNP on 1/13/2025 at 8:14 AM

## 2025-01-13 NOTE — OP NOTE
wet-field cautery. A scratch incision was made at the posterior border of the limbus for approximately 2.75 mm. This was then carried into clear cornea with a crescent blade in tunnel fashion. A 2.75 keratome was then used to enter into the anterior chamber. A bent-needle cystome was used to start a capsulorrhexis. It was finished with Ultrata forceps. Hydrodissection was carried out with BSS. The lens was removed in divide-and-conquer technique with phacoemulsification. Residual cortex was removed with the irrigation/aspiration handpiece. The capsule was polished with a silicone I/A tip. Provisc was then placed into the capsular bag. The SY 60 WF 21.0 was placed without difficulty into the capsular bag and rotated into good position. Residual viscoelastic was removed with irrigation/aspiration handpiece. Anterior chamber was refilled with BSS. The lens was found to be well centered. All wounds were found to be water tight. The conjunctiva was closed with coaptation cautery, and one drop of Prednisolone 1% and Cipro were placed into the eye. The lid speculum and drape were removed and the patient went to recovery in good condition.     Electronically signed by Ramirez Chaidez MD on 1/13/2025 at 10:09 AM

## 2025-01-13 NOTE — ANESTHESIA POSTPROCEDURE EVALUATION
Department of Anesthesiology  Postprocedure Note    Patient: Leeann Soler  MRN: [id number]  YOB: 1955  Date of evaluation: 1/13/2025    Procedure Summary       Date: 01/13/25 Room / Location: [address]    Anesthesia Start: 0938 Anesthesia Stop: 1009    Procedure: EYE CATARACT EMULSIFICATION INTRAOCULAR LENS IMPLANT RIGHT (Right: Eye) Diagnosis:       Nuclear sclerotic cataract of right eye      (Nuclear sclerotic cataract of right eye [H25.11])    Surgeons: Ramirez Chaidez MD Responsible Provider: Elba Malhotra MD    Anesthesia Type: MAC ASA Status: 3            Anesthesia Type: No value filed.    Jason Phase I: Jason Score: 10    Jason Phase II: Jason Score: 10    Anesthesia Post Evaluation    Comments: POST- ANESTHESIA EVALUATION       Pt Name: Leeann Soler  MRN: [id number]  YOB: 1955  Date of evaluation: 1/13/2025  Time:  11:02 AM      BP (!) 167/83   Pulse 66   Temp 97.3 °F (36.3 °C) (Infrared)   Resp 16   Ht 1.6 m (5' 3\")   Wt 78.5 kg (173 lb)   SpO2 99%   BMI 30.65 kg/m²      Consciousness Level  Awake  Cardiopulmonary Status  Stable  Pain Adequately Treated YES  Nausea / Vomiting  NO  Adequate Hydration  YES  Anesthesia Related Complications NONE      Electronically signed by Elba Malhotra MD on 1/13/2025 at 11:02 AM           No notable events documented.

## 2025-01-13 NOTE — ANESTHESIA PRE PROCEDURE
Department of Anesthesiology  Preprocedure Note       Name:  Leeann Soler   Age:  69 y.o.  :  1955                                          MRN:  [id number]         Date:  2025      Surgeon: Surgeon(s):  Ramirez Chaidez MD    Procedure: Procedure(s):  EYE CATARACT EMULSIFICATION INTRAOCULAR LENS IMPLANT RIGHT    Medications prior to admission:   Prior to Admission medications    Medication Sig Start Date End Date Taking? Authorizing Provider   NONFORMULARY L-5-MTHF  Herb Supplement   Yes Anand Hull MD   MISC NATURAL PRODUCTS PO Take by mouth Olive leaf extract - antiviral   Taking other natural supplements to support immune system  [place] - vit C   Yes Anand Hull MD   clindamycin (CLEOCIN) 300 MG capsule Take 1 capsule by mouth 2 times daily   Yes Anand Hull MD   torsemide (DEMADEX) 10 MG tablet Take 1 tablet by mouth in the morning and 1 tablet in the evening. 0.25 tablet BID.   Yes Anand Hull MD   chlorzoxazone (PARAFON FORTE) 750 MG TABS tablet Take 1 tablet by mouth 3 times daily as needed for Muscle spasms   Yes Anand Hull MD   guanFACINE (TENEX) 1 MG tablet Take 1 tablet by mouth 3 times daily   Yes Anand Hull MD   POTASSIUM PO Take 20 mcg by mouth daily   Yes Anand Hull MD   Coenzyme Q10 (CO Q 10 PO) Take 1 capsule by mouth daily Citrus Q10   Yes Anand Hull MD   penicillin v potassium (VEETID) 500 MG tablet Take 1 tablet by mouth 4 times daily   Yes Anand Hull MD   NONFORMULARY ACTH gel.1 ml daily   Yes Anand Hull MD   NONFORMULARY Cortef trini 2 mg as needed   Yes Anand Hull MD   Cholecalciferol (VITAMIN D3) 3000 units TABS Take 1,000 Units by mouth daily Drops dissolvable onto tongue   Yes Anand Hull MD   hydrocortisone (CORTEF) 5 MG tablet Take 11.5 tablets by mouth daily 11.5mg in am - 1mg @ noon 18  Yes Anand Hull MD   Hydroxocobalamin 1000

## 2025-06-18 ENCOUNTER — HOSPITAL ENCOUNTER (OUTPATIENT)
Age: 70
Discharge: HOME | End: 2025-06-18
Payer: MEDICARE

## 2025-06-18 LAB
25(OH)D3 SERPL-MCNC: 84.7 NG/ML (ref 30–100)
ACTH PLAS-MCNC: 8 PG/ML (ref 7–63)
ALBUMIN SERPL-MCNC: 4.1 G/DL (ref 3.5–5.2)
ALBUMIN/GLOB SERPL: 1.1 {RATIO} (ref 1–2.5)
ALP SERPL-CCNC: 85 U/L (ref 35–104)
ALT SERPL-CCNC: 14 U/L (ref 10–35)
ANION GAP SERPL CALCULATED.3IONS-SCNC: 13 MMOL/L (ref 9–16)
AST SERPL-CCNC: 17 U/L (ref 10–35)
BILIRUB SERPL-MCNC: 0.3 MG/DL (ref 0–1.2)
BILIRUB UR QL STRIP: NEGATIVE
BUN SERPL-MCNC: 15 MG/DL (ref 8–23)
CALCIUM SERPL-MCNC: 9.4 MG/DL (ref 8.6–10.4)
CHLORIDE SERPL-SCNC: 107 MMOL/L (ref 98–107)
CHOLEST SERPL-MCNC: 201 MG/DL (ref 0–199)
CHOLESTEROL/HDL RATIO: 4.2
CLARITY UR: ABNORMAL
CO2 SERPL-SCNC: 22 MMOL/L (ref 20–31)
COLOR UR: YELLOW
CORTIS SERPL-MCNC: 19.3 UG/DL (ref 2.5–19.5)
CORTISOL COLLECTION INFO: NORMAL
CREAT SERPL-MCNC: 0.7 MG/DL (ref 0.6–0.9)
CRYSTALS URNS MICRO: ABNORMAL /HPF
CRYSTALS URNS MICRO: ABNORMAL /HPF
EPI CELLS #/AREA URNS HPF: ABNORMAL /HPF (ref 0–5)
ERYTHROCYTE [DISTWIDTH] IN BLOOD BY AUTOMATED COUNT: 13.8 % (ref 11.8–14.4)
ERYTHROCYTE [SEDIMENTATION RATE] IN BLOOD BY PHOTOMETRIC METHOD: 33 MM/HR (ref 0–30)
ESTRADIOL LEVEL: 31.2 PG/ML
FOLATE SERPL-MCNC: >40 NG/ML (ref 4.8–24.2)
GFR, ESTIMATED: >90 ML/MIN/1.73M2
GLUCOSE SERPL-MCNC: 107 MG/DL (ref 74–99)
GLUCOSE UR STRIP-MCNC: NEGATIVE MG/DL
HCT VFR BLD AUTO: 42.2 % (ref 36.3–47.1)
HDLC SERPL-MCNC: 48 MG/DL
HGB BLD-MCNC: 13.1 G/DL (ref 11.9–15.1)
HGB UR QL STRIP.AUTO: NEGATIVE
IRON SATN MFR SERPL: 18 % (ref 20–55)
IRON SERPL-MCNC: 51 UG/DL (ref 37–145)
KETONES UR STRIP-MCNC: NEGATIVE MG/DL
LDLC SERPL CALC-MCNC: 129 MG/DL (ref 0–100)
LEUKOCYTE ESTERASE UR QL STRIP: NEGATIVE
MCH RBC QN AUTO: 28.4 PG (ref 25.2–33.5)
MCHC RBC AUTO-ENTMCNC: 31 G/DL (ref 28.4–34.8)
MCV RBC AUTO: 91.5 FL (ref 82.6–102.9)
MUCOUS THREADS URNS QL MICRO: ABNORMAL
NITRITE UR QL STRIP: NEGATIVE
NRBC BLD-RTO: 0 PER 100 WBC
PH UR STRIP: 5.5 [PH] (ref 5–8)
PLATELET # BLD AUTO: 230 K/UL (ref 138–453)
PMV BLD AUTO: 10.9 FL (ref 8.1–13.5)
POTASSIUM SERPL-SCNC: 4.4 MMOL/L (ref 3.7–5.3)
PROGEST SERPL-MCNC: 6.12 NG/ML
PROT SERPL-MCNC: 7.8 G/DL (ref 6.6–8.7)
PROT UR STRIP-MCNC: NEGATIVE MG/DL
RBC # BLD AUTO: 4.61 M/UL (ref 3.95–5.11)
RBC #/AREA URNS HPF: ABNORMAL /HPF (ref 0–2)
SODIUM SERPL-SCNC: 142 MMOL/L (ref 136–145)
SP GR UR STRIP: 1.02 (ref 1–1.03)
T3FREE SERPL-MCNC: 3.16 PG/ML (ref 2–4.4)
T4 FREE SERPL-MCNC: 1.1 NG/DL (ref 0.92–1.68)
TESTOST SERPL-MCNC: 25 NG/DL (ref 3–41)
TIBC SERPL-MCNC: 284 UG/DL (ref 250–450)
TRIGL SERPL-MCNC: 122 MG/DL
TSH SERPL DL<=0.05 MIU/L-ACNC: 3.13 UIU/ML (ref 0.27–4.2)
UNSATURATED IRON BINDING CAPACITY: 233 UG/DL (ref 112–347)
URATE SERPL-MCNC: 5.6 MG/DL (ref 2.4–5.7)
UROBILINOGEN UR STRIP-ACNC: NORMAL EU/DL (ref 0–1)
VIT B12 SERPL-MCNC: >2000 PG/ML (ref 232–1245)
VLDLC SERPL CALC-MCNC: 24 MG/DL (ref 1–30)
WBC #/AREA URNS HPF: ABNORMAL /HPF (ref 0–5)
WBC OTHER # BLD: 6.9 K/UL (ref 3.5–11.3)

## 2025-06-18 PROCEDURE — 81001 URINALYSIS AUTO W/SCOPE: CPT

## 2025-06-18 PROCEDURE — 84550 ASSAY OF BLOOD/URIC ACID: CPT

## 2025-06-18 PROCEDURE — 84439 ASSAY OF FREE THYROXINE: CPT

## 2025-06-18 PROCEDURE — 36415 COLL VENOUS BLD VENIPUNCTURE: CPT

## 2025-06-18 PROCEDURE — 85027 COMPLETE CBC AUTOMATED: CPT

## 2025-06-18 PROCEDURE — 82746 ASSAY OF FOLIC ACID SERUM: CPT

## 2025-06-18 PROCEDURE — 82533 TOTAL CORTISOL: CPT

## 2025-06-18 PROCEDURE — 82670 ASSAY OF TOTAL ESTRADIOL: CPT

## 2025-06-18 PROCEDURE — 84443 ASSAY THYROID STIM HORMONE: CPT

## 2025-06-18 PROCEDURE — 82024 ASSAY OF ACTH: CPT

## 2025-06-18 PROCEDURE — 80053 COMPREHEN METABOLIC PANEL: CPT

## 2025-06-18 PROCEDURE — 84403 ASSAY OF TOTAL TESTOSTERONE: CPT

## 2025-06-18 PROCEDURE — 82306 VITAMIN D 25 HYDROXY: CPT

## 2025-06-18 PROCEDURE — 84144 ASSAY OF PROGESTERONE: CPT

## 2025-06-18 PROCEDURE — 84481 FREE ASSAY (FT-3): CPT

## 2025-06-18 PROCEDURE — 83550 IRON BINDING TEST: CPT

## 2025-06-18 PROCEDURE — 83540 ASSAY OF IRON: CPT

## 2025-06-18 PROCEDURE — 80061 LIPID PANEL: CPT

## 2025-06-18 PROCEDURE — 85652 RBC SED RATE AUTOMATED: CPT

## 2025-06-18 PROCEDURE — 82607 VITAMIN B-12: CPT

## (undated) DEVICE — PACK CATARACT SURGI+CARE CUSTOM

## (undated) DEVICE — GLOVE SURG SZ 75 CRM LTX FREE POLYISOPRENE POLYMER BEAD ANTI

## (undated) DEVICE — MARKER,SKIN,WI/RULER AND LABELS: Brand: MEDLINE

## (undated) DEVICE — GOWN,AURORA,NONREINFORCED,LARGE: Brand: MEDLINE

## (undated) DEVICE — MICROSURGICAL INSTRUMENT ANTERIOR CHAMBER CANNULA 27GA: Brand: ALCON

## (undated) DEVICE — GOWN,SIRUS,NONRNF,XLN/XL,20/CS: Brand: MEDLINE

## (undated) DEVICE — TOWEL,OR,DSP,ST,WHITE,DLX,2/PK,40PK/CS: Brand: MEDLINE

## (undated) DEVICE — CORD,CAUTERY,BIPOLAR,STERILE: Brand: MEDLINE

## (undated) DEVICE — SATINCRESCENT® KNIFE ANGLED BEVEL UP: Brand: SATINCRESCENT®

## (undated) DEVICE — CLEARCUT® SLIT KNIFE DUAL BEVEL 2.75MM ANGLED: Brand: CLEARCUT®

## (undated) DEVICE — SWABSTICK MEDICATED 10% POVIDONE IOD PVP TRIPE ANTISEP PREP 3 PER PK

## (undated) DEVICE — PENCIL ELECSURG BPLR 18 GA DISP

## (undated) DEVICE — SOLUTION IRRIGATION BAL SALT SOLUTION 500 ML STRL BSS

## (undated) DEVICE — THE MONARCH® "B" CARTRIDGE IS A SINGLE-USE POLYPROPYLENE CARTRIDGE FOR POSTERIOR CHAMBER IOL DELIVERY: Brand: MONARCH® II